# Patient Record
Sex: MALE | Race: WHITE | Employment: FULL TIME | URBAN - METROPOLITAN AREA
[De-identification: names, ages, dates, MRNs, and addresses within clinical notes are randomized per-mention and may not be internally consistent; named-entity substitution may affect disease eponyms.]

---

## 2018-09-04 ENCOUNTER — APPOINTMENT (OUTPATIENT)
Dept: CT IMAGING | Age: 63
DRG: 149 | End: 2018-09-04
Attending: EMERGENCY MEDICINE
Payer: MEDICARE

## 2018-09-04 ENCOUNTER — HOSPITAL ENCOUNTER (INPATIENT)
Age: 63
LOS: 1 days | Discharge: HOME | DRG: 149 | End: 2018-09-06
Attending: EMERGENCY MEDICINE | Admitting: GENERAL ACUTE CARE HOSPITAL
Payer: MEDICARE

## 2018-09-04 DIAGNOSIS — H91.93 BILATERAL DEAFNESS: ICD-10-CM

## 2018-09-04 DIAGNOSIS — R42 VERTIGO: ICD-10-CM

## 2018-09-04 DIAGNOSIS — E78.01 FAMILIAL HYPERCHOLESTEROLEMIA: ICD-10-CM

## 2018-09-04 DIAGNOSIS — R42 DIZZINESS: ICD-10-CM

## 2018-09-04 LAB
ALBUMIN SERPL-MCNC: 3.8 G/DL (ref 3.5–5)
ALBUMIN/GLOB SERPL: 1.2 {RATIO} (ref 1.1–2.2)
ALP SERPL-CCNC: 78 U/L (ref 45–117)
ALT SERPL-CCNC: 22 U/L (ref 12–78)
ANION GAP SERPL CALC-SCNC: 9 MMOL/L (ref 5–15)
APPEARANCE UR: CLEAR
AST SERPL-CCNC: 15 U/L (ref 15–37)
ATRIAL RATE: 78 BPM
BACTERIA URNS QL MICRO: NEGATIVE /HPF
BASOPHILS # BLD: 0 K/UL (ref 0–0.1)
BASOPHILS NFR BLD: 0 % (ref 0–1)
BILIRUB SERPL-MCNC: 0.5 MG/DL (ref 0.2–1)
BILIRUB UR QL: NEGATIVE
BUN SERPL-MCNC: 16 MG/DL (ref 6–20)
BUN/CREAT SERPL: 16 (ref 12–20)
CALCIUM SERPL-MCNC: 8.7 MG/DL (ref 8.5–10.1)
CALCULATED P AXIS, ECG09: 49 DEGREES
CALCULATED R AXIS, ECG10: -38 DEGREES
CALCULATED T AXIS, ECG11: 15 DEGREES
CHLORIDE SERPL-SCNC: 107 MMOL/L (ref 97–108)
CK SERPL-CCNC: 81 U/L (ref 39–308)
CO2 SERPL-SCNC: 26 MMOL/L (ref 21–32)
COLOR UR: ABNORMAL
CREAT SERPL-MCNC: 1.02 MG/DL (ref 0.7–1.3)
DIAGNOSIS, 93000: NORMAL
DIFFERENTIAL METHOD BLD: ABNORMAL
EOSINOPHIL # BLD: 0 K/UL (ref 0–0.4)
EOSINOPHIL NFR BLD: 0 % (ref 0–7)
EPITH CASTS URNS QL MICRO: ABNORMAL /LPF
ERYTHROCYTE [DISTWIDTH] IN BLOOD BY AUTOMATED COUNT: 12.8 % (ref 11.5–14.5)
GLOBULIN SER CALC-MCNC: 3.3 G/DL (ref 2–4)
GLUCOSE SERPL-MCNC: 134 MG/DL (ref 65–100)
GLUCOSE UR STRIP.AUTO-MCNC: 100 MG/DL
HCT VFR BLD AUTO: 40 % (ref 36.6–50.3)
HGB BLD-MCNC: 14.1 G/DL (ref 12.1–17)
HGB UR QL STRIP: NEGATIVE
HYALINE CASTS URNS QL MICRO: ABNORMAL /LPF (ref 0–5)
IMM GRANULOCYTES # BLD: 0 K/UL (ref 0–0.04)
IMM GRANULOCYTES NFR BLD AUTO: 0 % (ref 0–0.5)
KETONES UR QL STRIP.AUTO: NEGATIVE MG/DL
LEUKOCYTE ESTERASE UR QL STRIP.AUTO: NEGATIVE
LYMPHOCYTES # BLD: 0.8 K/UL (ref 0.8–3.5)
LYMPHOCYTES NFR BLD: 8 % (ref 12–49)
MCH RBC QN AUTO: 31.1 PG (ref 26–34)
MCHC RBC AUTO-ENTMCNC: 35.3 G/DL (ref 30–36.5)
MCV RBC AUTO: 88.1 FL (ref 80–99)
MONOCYTES # BLD: 0.4 K/UL (ref 0–1)
MONOCYTES NFR BLD: 4 % (ref 5–13)
NEUTS SEG # BLD: 8.5 K/UL (ref 1.8–8)
NEUTS SEG NFR BLD: 88 % (ref 32–75)
NITRITE UR QL STRIP.AUTO: NEGATIVE
NRBC # BLD: 0 K/UL (ref 0–0.01)
NRBC BLD-RTO: 0 PER 100 WBC
P-R INTERVAL, ECG05: 162 MS
PH UR STRIP: 7.5 [PH] (ref 5–8)
PLATELET # BLD AUTO: 169 K/UL (ref 150–400)
PMV BLD AUTO: 9.8 FL (ref 8.9–12.9)
POTASSIUM SERPL-SCNC: 3.6 MMOL/L (ref 3.5–5.1)
PROT SERPL-MCNC: 7.1 G/DL (ref 6.4–8.2)
PROT UR STRIP-MCNC: NEGATIVE MG/DL
Q-T INTERVAL, ECG07: 406 MS
QRS DURATION, ECG06: 98 MS
QTC CALCULATION (BEZET), ECG08: 462 MS
RBC # BLD AUTO: 4.54 M/UL (ref 4.1–5.7)
RBC #/AREA URNS HPF: ABNORMAL /HPF (ref 0–5)
RBC MORPH BLD: ABNORMAL
SODIUM SERPL-SCNC: 142 MMOL/L (ref 136–145)
SP GR UR REFRACTOMETRY: 1.02 (ref 1–1.03)
TROPONIN I SERPL-MCNC: <0.05 NG/ML
UA: UC IF INDICATED,UAUC: ABNORMAL
UROBILINOGEN UR QL STRIP.AUTO: 1 EU/DL (ref 0.2–1)
VENTRICULAR RATE, ECG03: 78 BPM
WBC # BLD AUTO: 9.7 K/UL (ref 4.1–11.1)
WBC URNS QL MICRO: ABNORMAL /HPF (ref 0–4)

## 2018-09-04 PROCEDURE — 36415 COLL VENOUS BLD VENIPUNCTURE: CPT | Performed by: EMERGENCY MEDICINE

## 2018-09-04 PROCEDURE — 81001 URINALYSIS AUTO W/SCOPE: CPT | Performed by: EMERGENCY MEDICINE

## 2018-09-04 PROCEDURE — 99218 HC RM OBSERVATION: CPT

## 2018-09-04 PROCEDURE — G8978 MOBILITY CURRENT STATUS: HCPCS

## 2018-09-04 PROCEDURE — 96374 THER/PROPH/DIAG INJ IV PUSH: CPT

## 2018-09-04 PROCEDURE — 97530 THERAPEUTIC ACTIVITIES: CPT

## 2018-09-04 PROCEDURE — 74011250636 HC RX REV CODE- 250/636

## 2018-09-04 PROCEDURE — 99285 EMERGENCY DEPT VISIT HI MDM: CPT

## 2018-09-04 PROCEDURE — 74011250636 HC RX REV CODE- 250/636: Performed by: EMERGENCY MEDICINE

## 2018-09-04 PROCEDURE — 93005 ELECTROCARDIOGRAM TRACING: CPT

## 2018-09-04 PROCEDURE — 82550 ASSAY OF CK (CPK): CPT | Performed by: EMERGENCY MEDICINE

## 2018-09-04 PROCEDURE — 80053 COMPREHEN METABOLIC PANEL: CPT | Performed by: EMERGENCY MEDICINE

## 2018-09-04 PROCEDURE — 96375 TX/PRO/DX INJ NEW DRUG ADDON: CPT

## 2018-09-04 PROCEDURE — G8979 MOBILITY GOAL STATUS: HCPCS

## 2018-09-04 PROCEDURE — 97161 PT EVAL LOW COMPLEX 20 MIN: CPT

## 2018-09-04 PROCEDURE — 85025 COMPLETE CBC W/AUTO DIFF WBC: CPT | Performed by: EMERGENCY MEDICINE

## 2018-09-04 PROCEDURE — 70450 CT HEAD/BRAIN W/O DYE: CPT

## 2018-09-04 PROCEDURE — 84484 ASSAY OF TROPONIN QUANT: CPT | Performed by: EMERGENCY MEDICINE

## 2018-09-04 RX ORDER — ACETAMINOPHEN 325 MG/1
650 TABLET ORAL
Status: DISCONTINUED | OUTPATIENT
Start: 2018-09-04 | End: 2018-09-06 | Stop reason: HOSPADM

## 2018-09-04 RX ORDER — SODIUM CHLORIDE 0.9 % (FLUSH) 0.9 %
5-10 SYRINGE (ML) INJECTION AS NEEDED
Status: DISCONTINUED | OUTPATIENT
Start: 2018-09-04 | End: 2018-09-06 | Stop reason: HOSPADM

## 2018-09-04 RX ORDER — MECLIZINE HCL 12.5 MG 12.5 MG/1
25 TABLET ORAL
Status: COMPLETED | OUTPATIENT
Start: 2018-09-04 | End: 2018-09-04

## 2018-09-04 RX ORDER — ACYCLOVIR 400 MG/1
400 TABLET ORAL DAILY
COMMUNITY

## 2018-09-04 RX ORDER — MECLIZINE HYDROCHLORIDE 25 MG/1
25 TABLET ORAL EVERY 6 HOURS
Status: DISCONTINUED | OUTPATIENT
Start: 2018-09-04 | End: 2018-09-06 | Stop reason: HOSPADM

## 2018-09-04 RX ORDER — LORAZEPAM 2 MG/ML
1 INJECTION INTRAMUSCULAR
Status: COMPLETED | OUTPATIENT
Start: 2018-09-04 | End: 2018-09-04

## 2018-09-04 RX ORDER — ONDANSETRON 2 MG/ML
4 INJECTION INTRAMUSCULAR; INTRAVENOUS
Status: DISCONTINUED | OUTPATIENT
Start: 2018-09-04 | End: 2018-09-06 | Stop reason: HOSPADM

## 2018-09-04 RX ORDER — ACETAMINOPHEN 650 MG/1
650 SUPPOSITORY RECTAL
Status: DISCONTINUED | OUTPATIENT
Start: 2018-09-04 | End: 2018-09-06 | Stop reason: HOSPADM

## 2018-09-04 RX ORDER — ONDANSETRON 2 MG/ML
INJECTION INTRAMUSCULAR; INTRAVENOUS
Status: COMPLETED
Start: 2018-09-04 | End: 2018-09-04

## 2018-09-04 RX ORDER — GUAIFENESIN 100 MG/5ML
81 LIQUID (ML) ORAL DAILY
Status: DISCONTINUED | OUTPATIENT
Start: 2018-09-05 | End: 2018-09-06 | Stop reason: HOSPADM

## 2018-09-04 RX ORDER — OMEPRAZOLE 40 MG/1
40 CAPSULE, DELAYED RELEASE ORAL DAILY
COMMUNITY

## 2018-09-04 RX ORDER — ONDANSETRON 2 MG/ML
4 INJECTION INTRAMUSCULAR; INTRAVENOUS
Status: COMPLETED | OUTPATIENT
Start: 2018-09-04 | End: 2018-09-04

## 2018-09-04 RX ORDER — SODIUM CHLORIDE 0.9 % (FLUSH) 0.9 %
5-10 SYRINGE (ML) INJECTION EVERY 8 HOURS
Status: DISCONTINUED | OUTPATIENT
Start: 2018-09-04 | End: 2018-09-06 | Stop reason: HOSPADM

## 2018-09-04 RX ADMIN — SODIUM CHLORIDE 1000 ML: 900 INJECTION, SOLUTION INTRAVENOUS at 10:07

## 2018-09-04 RX ADMIN — LORAZEPAM 1 MG: 2 INJECTION INTRAMUSCULAR; INTRAVENOUS at 10:08

## 2018-09-04 RX ADMIN — MECLIZINE 25 MG: 12.5 TABLET ORAL at 11:55

## 2018-09-04 RX ADMIN — MECLIZINE 25 MG: 12.5 TABLET ORAL at 10:07

## 2018-09-04 RX ADMIN — ONDANSETRON 4 MG: 2 INJECTION INTRAMUSCULAR; INTRAVENOUS at 09:17

## 2018-09-04 NOTE — ED NOTES
Assumed care of pt from [facility]. Pt eating dinner and in no acute distress at this time. VSS. Call bell within reach.

## 2018-09-04 NOTE — PROGRESS NOTES
Problem: Mobility Impaired (Adult and Pediatric) Goal: *Acute Goals and Plan of Care (Insert Text) 7Physical Therapy Goals Initiated 9/4/2018 1. Patient will transfer from bed to chair and chair to bed with independence using the least restrictive device within 7 day(s). 2.  Patient will perform sit to stand with independence within 7 day(s). 3.  Patient will ambulate with independence for 250 feet with the least restrictive device within 7 day(s). 4.  Patient will ascend/descend 12 stairs with handrail(s) with independence within 7 day(s). 5.  Patient will show improved MCKOY score to greater than or equal to 42 for low fall risk within 7days. physical Therapy Emergency Department EVALUATION with CMS G codes 
recommended admission Patient: Kelli Alves (40 y.o. male) Date: 9/4/2018 Primary Diagnosis: There are no admission diagnoses documented for this encounter. Precautions: fall ASSESSMENT : 
Based on the objective data described below, the patient presents with decreased balance, functional mobility, gait and with dizziness. Asked by Dr. Claudette Bugler to see pt for eval. 
Pt lives by himself and is normally fully independent with all activity without a device. He works with heavy machinery. He has not had any issues in the past with mobility. He has B choclear implants. At this time, pt is c/o dizziness and oscillopsia with position changes. He shows some discontinuity in smooth pursuit. He is able to complete VOR but appears to have more difficutly with head turn R. Saccades appear intact. On antoine hallpike, pt presents with strong nystagmus (post 2 doses of meclizine) to R and L remarkable with quick phase toward floor to each side. Although pt notes a slowing to near stop of the oscillopsia, nystagmus does slows but does not fully fatigue to either side.  On standing, pt lists to the left with dynamic movement and posterior left with eyes open rhomberg. When closing eyes, he is able to keep balance but shows increased sway. On the MCKOY balance testing, he scores 27/56 placing him in the moderate fall risk category. Pt 's strength is 5/5 with no apparent focal or lateralized deficit. His finger to nose and heel to shin is intact. He is independent in bed mobility but requires CGA for transfers and amb without device due to issues noted above. At this time, pt is showing significantly impaired function and gait from his normally independent baseline. Nystagmus is not clearly lateralized/consistent to one side and quick phase is as noted. He shows a list in standing and gait. Rounded with Dr. Isabel Hopson. Due to the above presentation, she is seeking admission to the hospital. Pt will benefit from PT in the acute setting. Recommendations for PT post acute care will depend upon progress and final diagnosis. Admission for this patient is recommended with continued acute therapy. PLAN : 
Frequency/Duration: Pending admission, the patient will be followed by physical therapy 5 times a week to address goals. If admitted, Recommendations and Planned Interventions: 
[]           Bed Mobility Training             [x]    Neuromuscular Re-Education 
[x]           Transfer Training                   []    Orthotic/Prosthetic Training 
[x]           Gait Training                         []    Modalities [x]           Therapeutic Exercises           []    Edema Management/Control 
[x]           Therapeutic Activities            [x]    Patient and Family Training/Education 
[]           Other (comment): 
 
Discharge Recommendations:  
 
[]   Home with family []   Skilled nursing facility []   Admission to hospital with rehab likely needed 
[]   Inpatient rehab referral 
[]   Outpatient physical therapy referral 
[x]   Other:see note; to be determined after final dx and progress Further Equipment Recommendations for Discharge: []   Rolling walker with 5\" wheels 
[]   Crutches  
[]   Cane  
[]   Wheelchair  
[x]   Other: TBD 
 
COMMUNICATION/EDUCATION:  
Communication/Collaboration: 
[]   Fall prevention education was provided and the patient/caregiver indicated understanding. [x]   Patient/family have participated as able and agree with findings and recommendations. []   Patient is unable to participate in plan of care at this time. Findings and recommendations were discussed with: MD physician SUBJECTIVE:  
Patient stated I don't feel balanced.  OBJECTIVE DATA SUMMARY:  
 
Past Medical History:  
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   
 as of 6/10/16 pt states ongoing x1 yr and still being evaluated Past Surgical History:  
Procedure Laterality Date  HX CATARACT REMOVAL Right 06/14/2016  HX HERNIA REPAIR Bilateral 2010 Bilateral Inguinal Hernia Repair with Mesh  NEUROLOGICAL PROCEDURE UNLISTED  approx 2010 Excision Cyst from Petuitary in Brain Prior Level of Function/Home Situation: fully independent w/o device Home Situation Home Environment: Private residence Living Alone: Yes Support Systems: Family member(s) Patient Expects to be Discharged to[de-identified] Private residence Current DME Used/Available at Home: None Critical Behavior: 
Neurologic State: Alert Orientation Level: Oriented X4 Cognition: Follows commands Strength:   
Strength: Within functional limits Tone & Sensation:  
Tone: Normal 
  
  
  
  
Sensation: Intact Range Of Motion: 
AROM: Within functional limits PROM: Within functional limits Coordination: 
Coordination: Within functional limits (heel to shin and finger to nose intact) Functional Mobility: 
Bed Mobility: 
Rolling: Independent Supine to Sit: Independent Sit to Supine: Independent Transfers: 
Sit to Stand: Contact guard assistance Stand to Sit: Contact guard assistance Balance:  
Sitting: Intact Standing: Impaired Standing - Static: Occassional;Fair 
Standing - Dynamic : Poor Ambulation/Gait Training: 
Distance (ft): 25 Feet (ft) Assistive Device: Gait belt Ambulation - Level of Assistance: Contact guard assistance Gait Abnormalities: Path deviations;Trunk sway increased; Other (list to L) Base of Support: Widened Speed/Kylie: Slow Swing Pattern: Left asymmetrical;Right asymmetrical 
  
  
  
    
 
Special Tests: 
Mckoy Balance Test: 
 
Sitting to Standing: 3 Standing Unsupported: 2 Sitting with Back Unsupported: 4 Standing to Sittin Transfers: 4 Standing Unsupported with Eyes Closed: 3 Standing Unsupported with Feet Together: 0 Reach Forward with Outstretched Arm: 0  Object: 3 Turn to Look Over Shoulders: 3 Turn 360 Degrees: 0 Alternate Foot on Step/Stool: 1 Standing Unsupported One Foot in Front: 0 Stand on One Le Total: 27 
  
 
 
56=Maximum possible score;  
0-20=High fall risk 21-40=Moderate fall risk 41-56=Low fall risk Mckoy Balance Test and G-code impairment scale: 
Percentage of Impairment CH 
 
0% 
 CI 
 
1-19% CJ 
 
20-39% CK 
 
40-59% CL 
 
60-79% CM 
 
80-99% CN  
 
100% Destiney Khanna Score 0-56 56 45-55 34-44 23-33 12-22 1-11 0 In compliance with CMSs Claims Based Outcome Reporting, the following G-code set was chosen for this patient based on their primary functional limitation being treated: The outcome measure chosen to determine the severity of the functional limitation was the MCKOY with a score of 27/56 which was correlated with the impairment scale. ? Mobility - Walking and Moving Around:  
  - CURRENT STATUS: CK - 40%-59% impaired, limited or restricted  - GOAL STATUS: CH - 0% impaired, limited or restricted  - D/C STATUS:  ---------------To be determined---------------  
 
Pain: 
  no c/o Please refer to the flowsheet for vital signs taken during this treatment. After treatment:  
[]   Patient left in no apparent distress sitting up in chair 
[x]   Patient left in no apparent distress in bed 
[x]   Call bell left within reach [x]   Nursing notified 
[]   Caregiver present 
[]   Bed alarm activated Thank you for this referral. 
Abel Arvizu, PT Time Calculation: 23 mins

## 2018-09-04 NOTE — ED PROVIDER NOTES
EMERGENCY DEPARTMENT HISTORY AND PHYSICAL EXAM 
 
 
Date: 9/4/2018 Patient Name: Christine Caraballo History of Presenting Illness Chief Complaint Patient presents with  Dizziness  
  pt reports he woke during the night with dizziness, vomited x3  Vomiting History Provided By: Patient HPI: Christine Caraballo, 70 y.o. male with PMHx significant for bilateral cochlear implants, GERD, presents to the ED with cc of dizziness x 3 AM today, becoming constant at 6 AM with associated nausea and vomiting. He states he woke up at 3 AM dizzy and stumbled to the bathroom. Pt states he went back to sleep and woke again at 6 AM still dizzy with nausea and vomiting. He states the dizziness is exacerbated by walking. Pt notes he went to bed last night at 11 PM and felt fine at that time. He denies recent sinus or ear infection. Pt denies HA, vision changes, or tingling/numbness. He denies CP, SOB, diarrhea, or dysuria. Pt denies PMHx of heart problems. There are no other complaints, changes, or physical findings at this time. PCP: Mary Atkins MD 
 
Current Outpatient Prescriptions Medication Sig Dispense Refill  atorvastatin (LIPITOR) 20 mg tablet Take  by mouth daily. Past History Past Medical History: 
Past Medical History:  
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   
 as of 6/10/16 pt states ongoing x1 yr and still being evaluated Past Surgical History: 
Past Surgical History:  
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 Social History: 
Social History Substance Use Topics  Smoking status: Never Smoker  Smokeless tobacco: Not on file  Alcohol use Yes Comment: 3 mixed drinks per week as of 6/10/16 Allergies: 
No Known Allergies Review of Systems Review of Systems Constitutional: Negative for chills, fatigue and fever. HENT: Negative for congestion, rhinorrhea and sore throat. Eyes: Negative for pain, discharge and visual disturbance. Respiratory: Negative for cough, chest tightness, shortness of breath and wheezing. Cardiovascular: Negative for chest pain, palpitations and leg swelling. Gastrointestinal: Positive for nausea and vomiting. Negative for abdominal pain, constipation and diarrhea. Genitourinary: Negative for dysuria, frequency and hematuria. Musculoskeletal: Negative for arthralgias, back pain and myalgias. Skin: Negative for rash. Neurological: Positive for dizziness. Negative for weakness, light-headedness, numbness and headaches. Psychiatric/Behavioral: Negative. Physical Exam  
Physical Exam  
Constitutional: He is oriented to person, place, and time. He appears well-developed and well-nourished. No distress. HENT:  
Head: Normocephalic and atraumatic. Eyes: EOM are normal. Pupils are equal, round, and reactive to light. Right eye exhibits no discharge. Left eye exhibits no discharge. No scleral icterus. Right eye exhibits no nystagmus. Left eye exhibits no nystagmus. Neck: Normal range of motion. Neck supple. No tracheal deviation present. Cardiovascular: Normal rate, regular rhythm, normal heart sounds and intact distal pulses. Exam reveals no gallop and no friction rub. No murmur heard. Pulmonary/Chest: Effort normal and breath sounds normal. No respiratory distress. He has no wheezes. He has no rales. Abdominal: Soft. He exhibits no distension. There is no tenderness. Musculoskeletal: Normal range of motion. He exhibits no edema. Lymphadenopathy:  
  He has no cervical adenopathy. Neurological: He is alert and oriented to person, place, and time. Pt has normal speech and facial symmetry. 5/5 strength in all extremities. FNF and heel to shin intact b/l. Pronator drift negative. Skin: Skin is warm and dry. No rash noted. Psychiatric: He has a normal mood and affect. Nursing note and vitals reviewed. Diagnostic Study Results Labs - Recent Results (from the past 12 hour(s)) EKG, 12 LEAD, INITIAL Collection Time: 09/04/18  8:59 AM  
Result Value Ref Range Ventricular Rate 78 BPM  
 Atrial Rate 78 BPM  
 P-R Interval 162 ms QRS Duration 98 ms Q-T Interval 406 ms QTC Calculation (Bezet) 462 ms Calculated P Axis 49 degrees Calculated R Axis -38 degrees Calculated T Axis 15 degrees Diagnosis Normal sinus rhythm Possible Left atrial enlargement Left axis deviation Left ventricular hypertrophy When compared with ECG of 03-FEB-2010 08:21, No significant change was found Confirmed by Basil Phoenix MD, Perlita Goodrich ([id number]) on 9/4/2018 2:21:04 PM 
  
CBC WITH AUTOMATED DIFF Collection Time: 09/04/18  9:16 AM  
Result Value Ref Range WBC 9.7 4.1 - 11.1 K/uL  
 RBC 4.54 4.10 - 5.70 M/uL  
 HGB 14.1 12.1 - 17.0 g/dL HCT 40.0 36.6 - 50.3 % MCV 88.1 80.0 - 99.0 FL  
 MCH 31.1 26.0 - 34.0 PG  
 MCHC 35.3 30.0 - 36.5 g/dL  
 RDW 12.8 11.5 - 14.5 % PLATELET 163 402 - 240 K/uL MPV 9.8 8.9 - 12.9 FL  
 NRBC 0.0 0  WBC ABSOLUTE NRBC 0.00 0.00 - 0.01 K/uL NEUTROPHILS 88 (H) 32 - 75 % LYMPHOCYTES 8 (L) 12 - 49 % MONOCYTES 4 (L) 5 - 13 % EOSINOPHILS 0 0 - 7 % BASOPHILS 0 0 - 1 % IMMATURE GRANULOCYTES 0 0.0 - 0.5 % ABS. NEUTROPHILS 8.5 (H) 1.8 - 8.0 K/UL  
 ABS. LYMPHOCYTES 0.8 0.8 - 3.5 K/UL  
 ABS. MONOCYTES 0.4 0.0 - 1.0 K/UL  
 ABS. EOSINOPHILS 0.0 0.0 - 0.4 K/UL  
 ABS. BASOPHILS 0.0 0.0 - 0.1 K/UL  
 ABS. IMM. GRANS. 0.0 0.00 - 0.04 K/UL  
 DF SMEAR SCANNED    
 RBC COMMENTS NORMOCYTIC, NORMOCHROMIC METABOLIC PANEL, COMPREHENSIVE  Collection Time: 09/04/18  9:16 AM  
 Result Value Ref Range Sodium 142 136 - 145 mmol/L Potassium 3.6 3.5 - 5.1 mmol/L Chloride 107 97 - 108 mmol/L  
 CO2 26 21 - 32 mmol/L Anion gap 9 5 - 15 mmol/L Glucose 134 (H) 65 - 100 mg/dL BUN 16 6 - 20 MG/DL Creatinine 1.02 0.70 - 1.30 MG/DL  
 BUN/Creatinine ratio 16 12 - 20 GFR est AA >60 >60 ml/min/1.73m2 GFR est non-AA >60 >60 ml/min/1.73m2 Calcium 8.7 8.5 - 10.1 MG/DL Bilirubin, total 0.5 0.2 - 1.0 MG/DL  
 ALT (SGPT) 22 12 - 78 U/L  
 AST (SGOT) 15 15 - 37 U/L Alk. phosphatase 78 45 - 117 U/L Protein, total 7.1 6.4 - 8.2 g/dL Albumin 3.8 3.5 - 5.0 g/dL Globulin 3.3 2.0 - 4.0 g/dL A-G Ratio 1.2 1.1 - 2.2 CK W/ REFLX CKMB Collection Time: 09/04/18  9:16 AM  
Result Value Ref Range CK 81 39 - 308 U/L  
TROPONIN I Collection Time: 09/04/18  9:16 AM  
Result Value Ref Range Troponin-I, Qt. <0.05 <0.05 ng/mL URINALYSIS W/ REFLEX CULTURE Collection Time: 09/04/18 11:09 AM  
Result Value Ref Range Color YELLOW/STRAW Appearance CLEAR CLEAR Specific gravity 1.018 1.003 - 1.030    
 pH (UA) 7.5 5.0 - 8.0 Protein NEGATIVE  NEG mg/dL Glucose 100 (A) NEG mg/dL Ketone NEGATIVE  NEG mg/dL Bilirubin NEGATIVE  NEG Blood NEGATIVE  NEG Urobilinogen 1.0 0.2 - 1.0 EU/dL Nitrites NEGATIVE  NEG Leukocyte Esterase NEGATIVE  NEG    
 WBC 0-4 0 - 4 /hpf  
 RBC 0-5 0 - 5 /hpf Epithelial cells FEW FEW /lpf Bacteria NEGATIVE  NEG /hpf  
 UA:UC IF INDICATED CULTURE NOT INDICATED BY UA RESULT CNI Hyaline cast 0-2 0 - 5 /lpf Radiologic Studies -  
 
CT Results  (Last 48 hours) 09/04/18 0957  CT HEAD WO CONT Final result Impression:  IMPRESSION: No acute changes. Narrative:  EXAM:  CT HEAD WO CONT INDICATION:   dizziness with n/v x last night COMPARISON: None. CONTRAST:  None. TECHNIQUE: Unenhanced CT of the head was performed using 5 mm images.  Brain and  
 bone windows were generated. CT dose reduction was achieved through use of a  
standardized protocol tailored for this examination and automatic exposure  
control for dose modulation. FINDINGS:  
There is no extra-axial fluid collection hemorrhage or shift. No mass. Metallic  
artifact prominent distal basilar artery. Medical Decision Making I am the first provider for this patient. I reviewed the vital signs, available nursing notes, past medical history, past surgical history, family history and social history. Vital Signs-Reviewed the patient's vital signs. Patient Vitals for the past 12 hrs: 
 Pulse Resp BP SpO2  
09/04/18 1300 86 16 126/74 98 % 09/04/18 1245 85 15 133/71 97 % 09/04/18 1230 84 14 136/77 97 % 09/04/18 1215 83 22 130/74 98 % 09/04/18 1200 82 19 133/72 95 % 09/04/18 1145 83 18 119/72 97 % 09/04/18 1130 83 14 134/73 96 % 09/04/18 1115 82 10 128/73 99 % 09/04/18 1100 86 11 126/73 97 % 09/04/18 1045 84 15 131/69 96 % 09/04/18 1030 78 11 131/74 99 % 09/04/18 1028 83 15 - 99 % 09/04/18 1015 79 17 129/72 95 % 09/04/18 1002 - - 135/70 -  
09/04/18 0945 80 16 132/71 93 % 09/04/18 0930 84 19 128/69 95 % 09/04/18 0909 82 22 147/89 96 % 09/04/18 0905 - - 148/82 - Pulse Oximetry Analysis - 96% on RA 
 
 
EKG interpretation: (Preliminary) 8:59 Rhythm: normal sinus rhythm; and regular . Rate (approx.): 78; WI interval: normal; QRS interval: normal ; ST/T wave: normal. 
Written by JAMIE Braga, as dictated by Perez Hernandez MD. Records Reviewed: Nursing Notes, Old Medical Records, Previous Radiology Studies and Previous Laboratory Studies Provider Notes (Medical Decision Making): DDx: BPPV, labyrinthitis, dehydration, electrolyte abnormality, TIA, CVA, intracranial mass ED Course:  
Initial assessment performed.  The patients presenting problems have been discussed, and they are in agreement with the care plan formulated and outlined with them. I have encouraged them to ask questions as they arise throughout their visit. 11:53 AM 
Attempted to ambulate pt, but he became dizzy. Will tx with additional Meclizine and re-evaluate. 1:07 PM 
Attempted to ambulate pt again. However, he was unable to ambulate secondary to dizziness. Will have PT evaluate him.  
 
1:56 PM 
Pt has been evaluated by PT. She states during exam, pt was having difficulty ambulating and was leaning to the L. After discussion, will admit for possible central vertigo. CONSULT NOTE:  
2:42 PM 
Jennifer Winkler MD spoke with Dr. Arnold Guzman, Specialty: Hospitalist 
Discussed pt's hx, disposition, and available diagnostic and imaging results. Reviewed care plans. Consultant will evaluate pt for admission. PLAN: 
1. Admit to Hospitalist 
 
Admission Note: 
2:46 PM 
Patient is being admitted to the hospital by Dr. Jagruti Kelly. The results of their tests and reasons for their admission have been discussed with them and available family. They convey agreement and understanding for the need to be admitted and for their admission diagnosis. Diagnosis Clinical Impression: Dizzy, vertigo D Attestations: This note is prepared by Jojo Baird and Lucio Watt, acting as a Scribes for MD Jennifer Hagen MD: The scribe's documentation has been prepared under my direction and personally reviewed by me in its entirety. I confirm that the notes above accurately reflects all work, treatment, procedures, and medical decision making performed by me.

## 2018-09-04 NOTE — H&P
Hospitalist Admission Note NAME: Kitty Baer :  1955 MRN:  [id number] Date/Time:  2018 7:57 PM 
 
Patient PCP: Leslie Sosa MD 
_____________________________________________________________________ Given the patient's current clinical presentation, I have a high level of concern for decompensation if discharged from the emergency department. Complex decision making was performed, which includes reviewing the patient's available past medical records, laboratory results, and x-ray films. My assessment of this patient's clinical condition and my plan of care is as follows. Assessment / Plan: 
 
Vertigo Nausea vomiting Hx bilateral cochlear implants 
-CT head negative 
-history of occupational hearing loss but denies history of strokes or BPV. Suspect this is all peripheral vertigo but he is at risk for strokes given his age and hx HLD. -unable to complete MRI  
-consult Neurology in AM.  Start baby ASA 
-check HgA1c and lipid profile in AM 
-PT OT eval and treat.   
-schedule meclizine. Zofran IV prn 
 
HLD 
-continue lipitor Code Status:  Full Surrogate Decision Maker: DVT Prophylaxis:  SCD 
GI Prophylaxis: not indicated Baseline:  . Works with fabrication. Subjective: CHIEF COMPLAINT:   dizziness HISTORY OF PRESENT ILLNESS:    
Chris Espinoza is a 70 y.o.  male with a history of HLD and  Bilateral cochlear implants who presents with acute onset of vertigo. Patient felt fine last night when he went to bed at 11pm.  He woke up at around 3am to use the restroom and then realized that he felt dizzy and unsteady. He describes vertigo type symptoms worse with change in position. He went back to sleep and woke up again at [place] still dizzy but now also with nausea and vomiting. His symptoms persisted so he presented to the ER for evaluation. CT head negative.   He was treated with meclizine and zofran but remained symptomatic. We were asked to admit for work up and evaluation of the above problems. Past Medical History:  
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   
 as of 6/10/16 pt states ongoing x1 yr and still being evaluated Past Surgical History:  
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 Authorizing Provider  
acyclovir (ZOVIRAX) 400 mg tablet Take 400 mg by mouth daily. Yes Phys Other, MD  
omeprazole (PRILOSEC) 40 mg capsule Take 40 mg by mouth daily. Yes Phys Other, MD  
vit C/E/Zn/coppr/lutein/zeaxan (PRESERVISION AREDS 2 PO) Take 1 Cap by mouth daily. Yes Phys Other, MD  
atorvastatin (LIPITOR) 20 mg tablet Take 20 mg by mouth daily. Yes Historical Provider REVIEW OF SYSTEMS:    
 
Total of 12 systems reviewed as follows:   
   POSITIVE= underlined text  Negative = text not underlined General:  fever, chills, sweats, generalized weakness, weight loss/gain,  
   loss of appetite Eyes:    blurred vision, eye pain, loss of vision, double vision ENT:    rhinorrhea, pharyngitis Respiratory:   cough, sputum production, SOB, BOCANEGRA, wheezing, pleuritic pain  
Cardiology:   chest pain, palpitations, orthopnea, PND, edema, syncope Gastrointestinal:  abdominal pain , N/V, diarrhea, dysphagia, constipation, bleeding Genitourinary:  frequency, urgency, dysuria, hematuria, incontinence Muskuloskeletal :  arthralgia, myalgia, back pain Hematology:  easy bruising, nose or gum bleeding, lymphadenopathy Dermatological: rash, ulceration, pruritis, color change / jaundice Endocrine:   hot flashes or polydipsia Neurological:  headache, dizziness / vertigo, confusion, focal weakness, paresthesia, Speech difficulties, memory loss, gait difficulty Psychological: Feelings of anxiety, depression, agitation Objective: VITALS:   
Visit Vitals  /74  Pulse 87  Resp 18  Ht 5' 11\" (1.803 m)  Wt 70.5 kg (155 lb 6.8 oz)  SpO2 95%  BMI 21.68 kg/m2 PHYSICAL EXAM: 
 
General:    Alert, cooperative, no distress, appears stated age. HEENT: Atraumatic, anicteric sclerae, pink conjunctivae. No oral ulcers, mucosa moist, throat clear, dentition fair Neck:  Supple, symmetrical,  thyroid: non tender Lungs:   Clear to auscultation bilaterally. No Wheezing or Rhonchi. No rales. Chest wall:  No tenderness  No Accessory muscle use. Heart:   Regular  rhythm,  No  murmur   No edema Abdomen:   Soft, non-tender. Not distended. Bowel sounds normal 
Extremities: No cyanosis. No clubbing,   
Skin:     Not pale. Not Jaundiced  No rashes Psych:  Good insight. Not depressed. Not anxious or agitated. Neurologic: EOMs intact. No facial asymmetry. No aphasia or slurred speech. Symmetrical strength, Sensation grossly intact. Alert and oriented X 4. No nystagmus 
 
_______________________________________________________________________ Care Plan discussed with: 
  Comments Patient x Family RN Care Manager Consultant:     
_______________________________________________________________________ Expected  Disposition:  
Home with Family x HH/PT/OT/RN   
SNF/LTC   
[facility]   
________________________________________________________________________ TOTAL TIME:  50  Minutes Critical Care Provided     Minutes non procedure based Comments  
 x Reviewed previous records  
>50% of visit spent in counseling and coordination of care  Discussion with patient and/or family and questions answered Given the patient's current clinical presentation, I have a high level of concern for decompensation if discharged from the ED. Complex decision making was performed which includes reviewing the patient's available past medical records, laboratory results, and Xray films. I have also directly communicated my plan and discussed this case with the involved ED physician.  
 
____________________________________________________________________ Syed Jarquin MD 
 
Procedures: see electronic medical records for all procedures/Xrays and details which were not copied into this note but were reviewed prior to creation of Plan. LAB DATA REVIEWED:   
Recent Results (from the past 24 hour(s)) EKG, 12 LEAD, INITIAL Collection Time: 09/04/18  8:59 AM  
Result Value Ref Range Ventricular Rate 78 BPM  
 Atrial Rate 78 BPM  
 P-R Interval 162 ms QRS Duration 98 ms Q-T Interval 406 ms QTC Calculation (Bezet) 462 ms Calculated P Axis 49 degrees Calculated R Axis -38 degrees Calculated T Axis 15 degrees Diagnosis Normal sinus rhythm Possible Left atrial enlargement Left axis deviation Left ventricular hypertrophy When compared with ECG of 03-FEB-2010 08:21, No significant change was found Confirmed by Magdalena Barry MD, Speedy Notice ([id number]) on 9/4/2018 2:21:04 PM 
  
CBC WITH AUTOMATED DIFF Collection Time: 09/04/18  9:16 AM  
Result Value Ref Range WBC 9.7 4.1 - 11.1 K/uL  
 RBC 4.54 4.10 - 5.70 M/uL  
 HGB 14.1 12.1 - 17.0 g/dL HCT 40.0 36.6 - 50.3 % MCV 88.1 80.0 - 99.0 FL  
 MCH 31.1 26.0 - 34.0 PG  
 MCHC 35.3 30.0 - 36.5 g/dL  
 RDW 12.8 11.5 - 14.5 % PLATELET 745 583 - 056 K/uL MPV 9.8 8.9 - 12.9 FL  
 NRBC 0.0 0  WBC ABSOLUTE NRBC 0.00 0.00 - 0.01 K/uL NEUTROPHILS 88 (H) 32 - 75 % LYMPHOCYTES 8 (L) 12 - 49 % MONOCYTES 4 (L) 5 - 13 % EOSINOPHILS 0 0 - 7 % BASOPHILS 0 0 - 1 % IMMATURE GRANULOCYTES 0 0.0 - 0.5 % ABS. NEUTROPHILS 8.5 (H) 1.8 - 8.0 K/UL  
 ABS. LYMPHOCYTES 0.8 0.8 - 3.5 K/UL  
 ABS. MONOCYTES 0.4 0.0 - 1.0 K/UL  
 ABS. EOSINOPHILS 0.0 0.0 - 0.4 K/UL  
 ABS. BASOPHILS 0.0 0.0 - 0.1 K/UL  
 ABS. IMM. GRANS. 0.0 0.00 - 0.04 K/UL  
 DF SMEAR SCANNED    
 RBC COMMENTS NORMOCYTIC, NORMOCHROMIC METABOLIC PANEL, COMPREHENSIVE Collection Time: 09/04/18  9:16 AM  
Result Value Ref Range Sodium 142 136 - 145 mmol/L Potassium 3.6 3.5 - 5.1 mmol/L Chloride 107 97 - 108 mmol/L  
 CO2 26 21 - 32 mmol/L Anion gap 9 5 - 15 mmol/L Glucose 134 (H) 65 - 100 mg/dL BUN 16 6 - 20 MG/DL Creatinine 1.02 0.70 - 1.30 MG/DL  
 BUN/Creatinine ratio 16 12 - 20 GFR est AA >60 >60 ml/min/1.73m2 GFR est non-AA >60 >60 ml/min/1.73m2 Calcium 8.7 8.5 - 10.1 MG/DL Bilirubin, total 0.5 0.2 - 1.0 MG/DL  
 ALT (SGPT) 22 12 - 78 U/L  
 AST (SGOT) 15 15 - 37 U/L Alk. phosphatase 78 45 - 117 U/L Protein, total 7.1 6.4 - 8.2 g/dL Albumin 3.8 3.5 - 5.0 g/dL Globulin 3.3 2.0 - 4.0 g/dL A-G Ratio 1.2 1.1 - 2.2 CK W/ REFLX CKMB Collection Time: 09/04/18  9:16 AM  
Result Value Ref Range CK 81 39 - 308 U/L  
TROPONIN I Collection Time: 09/04/18  9:16 AM  
Result Value Ref Range Troponin-I, Qt. <0.05 <0.05 ng/mL URINALYSIS W/ REFLEX CULTURE Collection Time: 09/04/18 11:09 AM  
Result Value Ref Range Color YELLOW/STRAW Appearance CLEAR CLEAR Specific gravity 1.018 1.003 - 1.030    
 pH (UA) 7.5 5.0 - 8.0 Protein NEGATIVE  NEG mg/dL Glucose 100 (A) NEG mg/dL Ketone NEGATIVE  NEG mg/dL Bilirubin NEGATIVE  NEG Blood NEGATIVE  NEG Urobilinogen 1.0 0.2 - 1.0 EU/dL Nitrites NEGATIVE  NEG Leukocyte Esterase NEGATIVE  NEG    
 WBC 0-4 0 - 4 /hpf  
 RBC 0-5 0 - 5 /hpf Epithelial cells FEW FEW /lpf Bacteria NEGATIVE  NEG /hpf  
 UA:UC IF INDICATED CULTURE NOT INDICATED BY UA RESULT CNI Hyaline cast 0-2 0 - 5 /lpf

## 2018-09-04 NOTE — PROGRESS NOTES
Pharmacy Clarification of Prior to Admission Medication Regimen The patient was interviewed regarding clarification of the prior to admission medication regimen and was questioned regarding use of any other inhalers, topical products, over the counter medications, herbal medications, vitamin products or ophthalmic/nasal/otic medication use. Information Obtained From: Patient, Mackenzie Restrepo Pertinent Pharmacy Findings: 
? Updated patients preferred outpatient pharmacy to: [address] 
? acyclovir (ZOVIRAX) 400 mg tablet: Per patient, this agent is prescribed BID, but patient is taking this agent daily. PTA medication list was corrected to the following:  
 
Prior to Admission Medications Prescriptions Last Dose Informant Patient Reported? Taking?  
acyclovir (ZOVIRAX) 400 mg tablet 9/3/2018 at Unknown time Self Yes Yes Sig: Take 400 mg by mouth daily. atorvastatin (LIPITOR) 20 mg tablet 9/3/2018 at Unknown time Self Yes Yes Sig: Take 20 mg by mouth daily. omeprazole (PRILOSEC) 40 mg capsule 9/3/2018 at Unknown time Self Yes Yes Sig: Take 40 mg by mouth daily. vit C/E/Zn/coppr/lutein/zeaxan (PRESERVISION AREDS 2 PO) 9/3/2018 at Unknown time Self Yes Yes Sig: Take 1 Cap by mouth daily. Facility-Administered Medications: None Thank you, 
Maine Guerra CPhT Medication History Pharmacy Technician

## 2018-09-04 NOTE — IP AVS SNAPSHOT
[address] [facility] 
[phone] Patient: Iris Chu MRN: [id number] HQI:[id number] About your hospitalization You were admitted on:  September 4, 2018 You last received care in the:  [facility] 3 NEUROSCIENCE TELEMETRY You were discharged on:  September 6, 2018 Why you were hospitalized Your primary diagnosis was:  Not on File Your diagnoses also included:  Vertigo Follow-up Information Follow up With Details Comments Contact Info [address] [place] [address] on 9/10/2018 at 10:00 please arrive at 9:30 to register for your appointment with photo ID and insurance card [address] [facility] 
[phone] [facility] On 9/12/2018 arrive at 8:45 AM for 9 AM appointment with your PCP, Dr. Rad Sim for post hospital follow up appointment. [address] [facility] [place] [address] 
[phone] Fredi Fontaine MD Go on 10/16/2018 Please follow up on October 16, 2018 at 8:40 arriving at 8:10 to register with photo ID, insurance card and current list of medication  [address] 
[address] [facility] 
[phone] Your Scheduled Appointments Tuesday October 16, 2018  8:40 AM EDT HOSPITAL FOLLOW-UP with Fredi Fontaine MD  
Neurology Clinic at [facility]) [address] [facility], 
[address] [place] [address], [address] [facility]  
[phone] Discharge Orders None A check ja indicates which time of day the medication should be taken. My Medications START taking these medications Instructions Each Dose to Equal  
 Morning Noon Evening Bedtime  
 aspirin 81 mg chewable tablet Start taking on:  9/7/2018 Your last dose was: Your next dose is: Take 1 Tab by mouth daily.   
 81 mg  
    
   
   
   
  
 meclizine 25 mg tablet Commonly known as:  ANTIVERT Your last dose was: Your next dose is: Take 1 Tab by mouth three (3) times daily as needed for up to 10 days. 25 mg CONTINUE taking these medications Instructions Each Dose to Equal  
 Morning Noon Evening Bedtime  
 acyclovir 400 mg tablet Commonly known as:  ZOVIRAX Your last dose was: Your next dose is: Take 400 mg by mouth daily. 400 mg  
    
   
   
   
  
 atorvastatin 20 mg tablet Commonly known as:  LIPITOR Your last dose was: Your next dose is: Take 20 mg by mouth daily. 20 mg  
    
   
   
   
  
 omeprazole 40 mg capsule Commonly known as:  PRILOSEC Your last dose was: Your next dose is: Take 40 mg by mouth daily. 40 mg PRESERVISION AREDS 2 PO Your last dose was: Your next dose is: Take 1 Cap by mouth daily. 1 Cap Where to Get Your Medications These medications were sent to [place] [address], [facility] [address] [address] Phone:  [phone]  
  aspirin 81 mg chewable tablet  
 meclizine 25 mg tablet Discharge Instructions Vertigo: Care Instructions Your Care Instructions Vertigo is the feeling that you or your surroundings are moving when there is no actual movement. It is often described as a feeling of spinning, whirling, falling, or tilting. Vertigo may make you vomit or feel nauseated. You may have trouble standing or walking and may lose your balance. Vertigo is often related to an inner ear problem, but it can have other more serious causes. If vertigo continues, you may need more tests to find its cause. Follow-up care is a key part of your treatment and safety.  Be sure to make and go to all appointments, and call your doctor if you are having problems. It's also a good idea to know your test results and keep a list of the medicines you take. How can you care for yourself at home? · Do not lie flat on your back. Prop yourself up slightly. This may reduce the spinning feeling. Keep your eyes open. · Move slowly so that you do not fall. · If your doctor recommends medicine, take it exactly as directed. · Do not drive while you are having vertigo. Certain exercises, called Razo-Daroff exercises, can help decrease vertigo. To do Razo-Daroff exercises: · Sit on the edge of a bed or sofa and quickly lie down on the side that causes the worst vertigo. Lie on your side with your ear down. · Stay in this position for at least 30 seconds or until the vertigo goes away. · Sit up. If this causes vertigo, wait for it to stop. · Repeat the procedure on the other side. · Repeat this 10 times. Do these exercises 2 times a day until the vertigo is gone. When should you call for help? Call 911 anytime you think you may need emergency care. For example, call if: 
  · You passed out (lost consciousness).  
  · You have symptoms of a stroke. These may include: 
¨ Sudden numbness, tingling, weakness, or loss of movement in your face, arm, or leg, especially on only one side of your body. ¨ Sudden vision changes. ¨ Sudden trouble speaking. ¨ Sudden confusion or trouble understanding simple statements. ¨ Sudden problems with walking or balance. ¨ A sudden, severe headache that is different from past headaches.  
 Call your doctor now or seek immediate medical care if: 
  · Vertigo occurs with a fever, a headache, or ringing in your ears.  
  · You have new or increased nausea and vomiting.  
 Watch closely for changes in your health, and be sure to contact your doctor if: 
  · Vertigo gets worse or happens more often.  
  · Vertigo has not gotten better after 2 weeks. Where can you learn more? Go to http://nicolette-eder.info/. Enter N566 in the search box to learn more about \"Vertigo: Care Instructions. \" Current as of: May 12, 2017 Content Version: 11.7 © 3811-8270 [employer]. Care instructions adapted under license by [employer] (which disclaims liability or warranty for this information). If you have questions about a medical condition or this instruction, always ask your healthcare professional. [facility] any warranty or liability for your use of this information. HazelMail Announcement We are excited to announce that we are making your provider's discharge notes available to you in HazelMail. You will see these notes when they are completed and signed by the physician that discharged you from your recent hospital stay. If you have any questions or concerns about any information you see in HazelMail, please call the Health Information Department where you were seen or reach out to your Primary Care Provider for more information about your plan of care. Introducing [facility]! Altagracia Bell introduces HazelMail patient portal. Now you can access parts of your medical record, email your doctor's office, and request medication refills online. 1. In your internet browser, go to [email or web address] [email or web address] 2. Click on the First Time User? Click Here link in the Sign In box. You will see the New Member Sign Up page. 3. Enter your HazelMail Access Code exactly as it appears below. You will not need to use this code after youve completed the sign-up process. If you do not sign up before the expiration date, you must request a new code. · HazelMail Access Code: [email or web address] Expires: 12/3/2018  7:21 PM 
 
4. Enter the last four digits of your Social Security Number (xxxx) and Date of Birth (mm/dd/yyyy) as indicated and click Submit.  You will be taken to the next sign-up page. 5. Create a NowPublict ID. This will be your [facility] login ID and cannot be changed, so think of one that is secure and easy to remember. 6. Create a NowPublict password. You can change your password at any time. 7. Enter your Password Reset Question and Answer. This can be used at a later time if you forget your password. 8. Enter your e-mail address. You will receive e-mail notification when new information is available in [address] 9. Click Sign Up. You can now view and download portions of your medical record. 10. Click the Download Summary menu link to download a portable copy of your medical information. If you have questions, please visit the Frequently Asked Questions section of the [facility] website. Remember, [facility] is NOT to be used for urgent needs. For medical emergencies, dial 911. Now available from your iPhone and Android! Introducing Armen Francis As a [facility] patient, I wanted to make you aware of our electronic visit tool called Armen Everanjumluis. [facility] 24/7 allows you to connect within minutes with a medical provider 24 hours a day, seven days a week via a mobile device or tablet or logging into a secure website from your computer. You can access Armen Francis from anywhere in the [place]. A virtual visit might be right for you when you have a simple condition and feel like you just dont want to get out of bed, or cant get away from work for an appointment, when your regular [facility] provider is not available (evenings, weekends or holidays), or when youre out of town and need minor care. Electronic visits cost only $49 and if the [facility] 24/7 provider determines a prescription is needed to treat your condition, one can be electronically transmitted to a nearby pharmacy*. Please take a moment to enroll today if you have not already done so.   The enrollment process is free and takes just a few minutes. To enroll, please download the [facility] 24/7 oni to your tablet or phone, or visit [email or web address] to enroll on your computer. And, as an [address] [place] [address] patient with a [employer] account, the results of your visits will be scanned into your electronic medical record and your primary care provider will be able to view the scanned results. We urge you to continue to see your regular [facility] provider for your ongoing medical care. And while your primary care provider may not be the one available when you seek a [employer] virtual visit, the peace of mind you get from getting a real diagnosis real time can be priceless. For more information on H[employer]Spark, view our Frequently Asked Questions (FAQs) at [email or web address]. Sincerely, 
 
Anette Andrew MD 
Chief Medical Officer [employer] *:  certain medications cannot be prescribed via [employer]ark Unresulted Labs-Please follow up with your PCP about these lab tests Order Current Status LYME AB TOTAL W/RFLX W BLOT In process Providers Seen During Your Hospitalization Provider Specialty Primary office phone Deborah Rodriguez MD Emergency Medicine [phone] Jere Villanueva MD Internal Medicine [phone] Your Primary Care Physician (PCP) Primary Care Physician Office Phone Office Fax Morgan Christie [phone] You are allergic to the following No active allergies Recent Documentation Height Weight BMI Smoking Status 1.803 m 70.5 kg 21.68 kg/m2 Never Smoker Emergency Contacts Name Discharge Info Relation Home Work Mobile Joann Mota DISCHARGE CAREGIVER [3] Daughter [21] [phone] Patient Belongings The following personal items are in your possession at time of discharge: Dental Appliances: At bedside, Lowers, Uppers  Visual Aid: None      Home Medications: None   Jewelry: None  Clothing: Pants, Socks (socks, underwear, shoes)    Other Valuables: Cell Phone, Eyeglasses (cell phone ) Please provide this summary of care documentation to your next provider. Signatures-by signing, you are acknowledging that this After Visit Summary has been reviewed with you and you have received a copy. Patient Signature:  ____________________________________________________________ Date:  ____________________________________________________________  
  
Giuseppe Mary Provider Signature:  ____________________________________________________________ Date:  ____________________________________________________________

## 2018-09-05 ENCOUNTER — APPOINTMENT (OUTPATIENT)
Dept: CT IMAGING | Age: 63
DRG: 149 | End: 2018-09-05
Attending: PSYCHIATRY & NEUROLOGY
Payer: MEDICARE

## 2018-09-05 LAB
CHOLEST SERPL-MCNC: 164 MG/DL
EST. AVERAGE GLUCOSE BLD GHB EST-MCNC: NORMAL MG/DL
HBA1C MFR BLD: 4.8 % (ref 4.2–6.3)
HDLC SERPL-MCNC: 46 MG/DL
HDLC SERPL: 3.6 {RATIO} (ref 0–5)
LDLC SERPL CALC-MCNC: 96.4 MG/DL (ref 0–100)
LIPID PROFILE,FLP: NORMAL
TRIGL SERPL-MCNC: 108 MG/DL (ref ?–150)
VLDLC SERPL CALC-MCNC: 21.6 MG/DL

## 2018-09-05 PROCEDURE — 97165 OT EVAL LOW COMPLEX 30 MIN: CPT

## 2018-09-05 PROCEDURE — 74011250636 HC RX REV CODE- 250/636: Performed by: GENERAL ACUTE CARE HOSPITAL

## 2018-09-05 PROCEDURE — 74011250637 HC RX REV CODE- 250/637: Performed by: INTERNAL MEDICINE

## 2018-09-05 PROCEDURE — 99218 HC RM OBSERVATION: CPT

## 2018-09-05 PROCEDURE — 80061 LIPID PANEL: CPT | Performed by: INTERNAL MEDICINE

## 2018-09-05 PROCEDURE — G8987 SELF CARE CURRENT STATUS: HCPCS

## 2018-09-05 PROCEDURE — 77030033269 HC SLV COMPR SCD KNE2 CARD -B

## 2018-09-05 PROCEDURE — 36415 COLL VENOUS BLD VENIPUNCTURE: CPT | Performed by: INTERNAL MEDICINE

## 2018-09-05 PROCEDURE — 65270000029 HC RM PRIVATE

## 2018-09-05 PROCEDURE — 74011250636 HC RX REV CODE- 250/636: Performed by: INTERNAL MEDICINE

## 2018-09-05 PROCEDURE — 97116 GAIT TRAINING THERAPY: CPT

## 2018-09-05 PROCEDURE — 74011636320 HC RX REV CODE- 636/320: Performed by: GENERAL ACUTE CARE HOSPITAL

## 2018-09-05 PROCEDURE — 83036 HEMOGLOBIN GLYCOSYLATED A1C: CPT | Performed by: INTERNAL MEDICINE

## 2018-09-05 PROCEDURE — G8988 SELF CARE GOAL STATUS: HCPCS

## 2018-09-05 PROCEDURE — 70496 CT ANGIOGRAPHY HEAD: CPT

## 2018-09-05 RX ORDER — SODIUM CHLORIDE 9 MG/ML
50 INJECTION, SOLUTION INTRAVENOUS
Status: COMPLETED | OUTPATIENT
Start: 2018-09-05 | End: 2018-09-05

## 2018-09-05 RX ORDER — SODIUM CHLORIDE 0.9 % (FLUSH) 0.9 %
10 SYRINGE (ML) INJECTION
Status: COMPLETED | OUTPATIENT
Start: 2018-09-05 | End: 2018-09-05

## 2018-09-05 RX ORDER — ATORVASTATIN CALCIUM 20 MG/1
20 TABLET, FILM COATED ORAL DAILY
Status: DISCONTINUED | OUTPATIENT
Start: 2018-09-05 | End: 2018-09-06 | Stop reason: HOSPADM

## 2018-09-05 RX ADMIN — ASPIRIN 81 MG: 81 TABLET, CHEWABLE ORAL at 08:36

## 2018-09-05 RX ADMIN — Medication 10 ML: at 06:54

## 2018-09-05 RX ADMIN — Medication 10 ML: at 14:02

## 2018-09-05 RX ADMIN — SODIUM CHLORIDE 50 ML/HR: 900 INJECTION, SOLUTION INTRAVENOUS at 18:30

## 2018-09-05 RX ADMIN — MECLIZINE HYDROCHLORIDE 25 MG: 25 TABLET ORAL at 06:53

## 2018-09-05 RX ADMIN — ACETAMINOPHEN 650 MG: 325 TABLET ORAL at 08:36

## 2018-09-05 RX ADMIN — Medication 10 ML: at 00:20

## 2018-09-05 RX ADMIN — Medication 10 ML: at 00:23

## 2018-09-05 RX ADMIN — Medication 10 ML: at 23:05

## 2018-09-05 RX ADMIN — MECLIZINE HYDROCHLORIDE 25 MG: 25 TABLET ORAL at 17:12

## 2018-09-05 RX ADMIN — MECLIZINE HYDROCHLORIDE 25 MG: 25 TABLET ORAL at 11:44

## 2018-09-05 RX ADMIN — ATORVASTATIN CALCIUM 20 MG: 20 TABLET, FILM COATED ORAL at 08:36

## 2018-09-05 RX ADMIN — IOPAMIDOL 100 ML: 755 INJECTION, SOLUTION INTRAVENOUS at 18:29

## 2018-09-05 RX ADMIN — MECLIZINE HYDROCHLORIDE 25 MG: 25 TABLET ORAL at 00:19

## 2018-09-05 RX ADMIN — MECLIZINE HYDROCHLORIDE 25 MG: 25 TABLET ORAL at 23:05

## 2018-09-05 RX ADMIN — Medication 10 ML: at 18:30

## 2018-09-05 NOTE — PROGRESS NOTES
Reason for Admission:   Pt was admitted under OBS on 9/4/18 d/t Vertigo and Vomiting. RRAT Score:          5 Plan for utilizing home health:      Not indicated at this time. Likelihood of Readmission:  LOW Transition of Care Plan:                  Pt lives alone in a two story home with 4 ROHIT. Pt has no DME. Pt has no experience with HH or SNF. 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 Pt I W ADLs and IADLs. Pt drives. Pt goes to [facility] in [place]. CM is calling to make a follow up appointment for Pt now. CM talked to therapy who just saw the Pt. They are recommending to MD that we work with Pt one more time tomorrow before discharge. They are recommending Outpatient Therapy PT for Vestibular Therapy. Pt lives close to [id number] [facility]. CM placed OP Rehab form for [facility] on bedside chart for MD review and signature. Pt indicated that either his brother or DTR would be able to transport him home in car. 12 PM  
CM made PCP appointment and placed on AVS.  Pt usually sees NP Gisel Canada at this home. Pt will follow up with Dr. Chao Brown at follow up appointment with [facility]. CM will continue to monitor discharge plan. Care Management Interventions PCP Verified by CM: Yes 
Palliative Care Criteria Met (RRAT>21 & CHF Dx)?: No 
Mode of Transport at Discharge: Other (see comment) Transition of Care Consult (CM Consult): Other (Outpatient Vestibular Rehab. ) MyChart Signup: No 
Discharge Durable Medical Equipment: No 
Physical Therapy Consult: Yes Occupational Therapy Consult: Yes Speech Therapy Consult: Yes Current Support Network: Own Home, Lives Alone, Family Lives [place] Confirm Follow Up Transport: Self Plan discussed with Pt/Family/Caregiver: Yes Freedom of Choice Offered: Yes Discharge Location Discharge Placement: Home with outpatient services Sadie Alvarez CM Ext [phone]

## 2018-09-05 NOTE — PROGRESS NOTES
Problem: Self Care Deficits Care Plan (Adult) Goal: *Acute Goals and Plan of Care (Insert Text) Occupational Therapy Goals Initiated 9/5/2018 1. Patient will perform grooming at the sink with supervision/set-up within 7 day(s). 2.  Patient will perform bathing at the sink with supervision/set-up within 7 day(s). 3.  Patient will perform lower body dressing with supervision/set-up within 7 day(s). 4.  Patient will perform toilet transfers with supervision/set-up within 7 day(s). 5.  Patient will perform all aspects of toileting with independence within 7 day(s). 6.  Patient will participate in upper extremity therapeutic exercise/activities with independence for 5 minutes within 7 day(s). 7.  Patient will utilize energy conservation techniques during functional activities with verbal cues within 7 day(s). Occupational Therapy EVALUATION Patient: Mila Jo (83 y.o. male) Date: 9/5/2018 Primary Diagnosis: Vertigo Precautions:     
 
ASSESSMENT : 
Based on the objective data described below, the patient presents with generalized weakness, decreased endurance, strength, functional mobility, and ADLs and vertigo. Pt was living alone and independent in all areas prior and did not use AD. Pt was cleared to be seen for therapy and all VSS. He was supine in bed and he did not have any nystagmus noted in eyes and was able to track vertical and horizontally. Pt was SBA for bed mobility and able to stand with SBA and was CGA for transfers. He was able to walk in the room and in the caceres but was nervous about becoming nauseous if he moved to much and too fast.  Pt was left sitting up in recliner with call bell with in reach and nursing notified that pt was sitting up and recommend that pt have further therapy at discharge at out pt vestibular rehab. Recommend that pt return home with assist from family Patient will benefit from skilled intervention to address the above impairments. Patients rehabilitation potential is considered to be Good Factors which may influence rehabilitation potential include:  
[x]             None noted []             Mental ability/status []             Medical condition []             Home/family situation and support systems []             Safety awareness []             Pain tolerance/management 
[]             Other: PLAN : 
Recommendations and Planned Interventions: 
[x]               Self Care Training                  []        Therapeutic Activities [x]               Functional Mobility Training    []        Cognitive Retraining 
[x]               Therapeutic Exercises           [x]        Endurance Activities [x]               Balance Training                   []        Neuromuscular Re-Education [x]               Visual/Perceptual Training     [x]   Home Safety Training 
[x]               Patient Education                 [x]        Family Training/Education []               Other (comment): Frequency/Duration: Patient will be followed by occupational therapy 4 times a week to address goals. Discharge Recommendations: Outpatient Vestibular rehab Further Equipment Recommendations for Discharge: tbd SUBJECTIVE:  
Patient stated I dont want to move much and get sick again. Connie Hudson OBJECTIVE DATA SUMMARY:  
HISTORY:  
Past Medical History:  
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   
 as of 6/10/16 pt states ongoing x1 yr and still being evaluated Past Surgical History:  
Procedure Laterality Date  HX CATARACT REMOVAL Right 06/14/2016  HX HERNIA REPAIR Bilateral 2010 Bilateral Inguinal Hernia Repair with Mesh  NEUROLOGICAL PROCEDURE UNLISTED  approx 2010 Excision Cyst from Petuitary in Brain Prior Level of Function/Environment/Context: pt lives alone and was independent with ADLs and working prior. Occupations in which the patient is/was successful, what are the barriers preventing that success:  
Performance Patterns (routines, roles, habits, and rituals):  
Personal Interests and/or values:  
Expanded or extensive additional review of patient history:  
 
Home Situation Home Environment: Private residence # Steps to Enter: 4 One/Two Story Residence: Two story # of Interior Steps: 12 Living Alone: Yes Support Systems: Child(bethany) Patient Expects to be Discharged to[de-identified] Private residence Current DME Used/Available at Home: None Hand dominance: Right EXAMINATION OF PERFORMANCE DEFICITS: 
Cognitive/Behavioral Status: 
Neurologic State: Alert Orientation Level: Appropriate for age;Oriented X4 Cognition: Appropriate decision making; Follows commands Perception: Appears intact Perseveration: No perseveration noted Safety/Judgement: Awareness of environment; Fall prevention Skin: in good health Edema: none Hearing: Auditory Auditory Impairment: Hard of hearing, bilateral 
 
Vision/Perceptual:   
    
    
   intact Range of Motion: BUE functional  
  
   
 
Strength: BUE functional 
  
   
 
Coordination: 
  
Fine Motor Skills-Upper: Left Intact; Right Intact Gross Motor Skills-Upper: Left Intact; Right Intact Tone & Sensation: 
Intact in BUE Balance: 
Sitting: Intact Standing: Impaired Standing - Static: Good Standing - Dynamic : Fair Functional Mobility and Transfers for ADLs: 
Bed Mobility: 
Supine to Sit: Supervision Transfers: 
Sit to Stand: Stand-by assistance Stand to Sit: Stand-by assistance Bed to Chair: Contact guard assistance;Stand-by assistance Toilet Transfer : Contact guard assistance;Stand-by assistance ADL Assessment: 
Feeding: Independent Oral Facial Hygiene/Grooming: Independent Bathing: Minimum assistance;Contact guard assistance Upper Body Dressing: Independent Lower Body Dressing: Minimum assistance;Contact guard assistance Toileting: Contact guard assistance;Stand by assistance Cognitive Retraining Safety/Judgement: Awareness of environment; Fall prevention Functional Measure: 
Barthel Index: 
 
Bathin Bladder: 10 Bowels: 10 
Groomin Dressin Feeding: 10 Mobility: 10 Stairs: 0 Toilet Use: 5 Transfer (Bed to Chair and Back): 10 Total: 60 Barthel and G-code impairment scale: 
Percentage of impairment CH 
0% CI 
1-19% CJ 
20-39% CK 
40-59% CL 
60-79% CM 
80-99% CN 
100% Barthel Score 0-100 100 99-80 79-60 59-40 20-39 1-19 
 0 Barthel Score 0-20 20 17-19 13-16 9-12 5-8 1-4 0 The Barthel ADL Index: Guidelines 1. The index should be used as a record of what a patient does, not as a record of what a patient could do. 2. The main aim is to establish degree of independence from any help, physical or verbal, however minor and for whatever reason. 3. The need for supervision renders the patient not independent. 4. A patient's performance should be established using the best available evidence. Asking the patient, friends/relatives and nurses are the usual sources, but direct observation and common sense are also important. However direct testing is not needed. 5. Usually the patient's performance over the preceding 24-48 hours is important, but occasionally longer periods will be relevant. 6. Middle categories imply that the patient supplies over 50 per cent of the effort. 7. Use of aids to be independent is allowed. Spencer Valenzuela., Barthel, DClemW. (2120). Functional evaluation: the Barthel Index. [address] [facility] (14)2. [facility] EVE Clarke, Brandon Rivers.[facility]., Harry, [address] (). Measuring the change indisability after inpatient rehabilitation; comparison of the responsiveness of the Barthel Index and Functional [place] Measure. Journal of Neurology, Neurosurgery, and Psychiatry, 66(4), 144-932. SHELL Stevens, KIRBY Pablo, & Hawa Bazzi M.A. (2004.) Assessment of post-stroke quality of life in cost-effectiveness studies: The usefulness of the Barthel Index and the EuroQoL-5D. [facility], 13, 707-47 G codes: In compliance with CMSs Claims Based Outcome Reporting, the following G-code set was chosen for this patient based on their primary functional limitation being treated: The outcome measure chosen to determine the severity of the functional limitation was the Barthel with a score of 60/100 which was correlated with the impairment scale. ? Self Care:  
  - CURRENT STATUS: CJ - 20%-39% impaired, limited or restricted  - GOAL STATUS: CI - 1%-19% impaired, limited or restricted  - D/C STATUS:  ---------------To be determined--------------- Occupational Therapy Evaluation Charge Determination History Examination Decision-Making MEDIUM Complexity : Expanded review of history including physical, cognitive and psychosocial  history  LOW Complexity : 1-3 performance deficits relating to physical, cognitive , or psychosocial skils that result in activity limitations and / or participation restrictions  LOW Complexity : No comorbidities that affect functional and no verbal or physical assistance needed to complete eval tasks Based on the above components, the patient evaluation is determined to be of the following complexity level: LOW Pain: 
Pain Scale 1: Numeric (0 - 10) Pain Intensity 1: 0 Pain Location 1: Head 
Pain Orientation 1: Anterior Pain Description 1: Aching Pain Intervention(s) 1: Medication (see MAR) Activity Tolerance:  
good Please refer to the flowsheet for vital signs taken during this treatment. After treatment:  
[x] Patient left in no apparent distress sitting up in chair 
[] Patient left in no apparent distress in bed 
[x] Call bell left within reach [x] Nursing notified 
[] Caregiver present [] Bed alarm activated COMMUNICATION/EDUCATION:  
The patients plan of care was discussed with: Physical Therapist and Registered Nurse. [x] Home safety education was provided and the patient/caregiver indicated understanding. [x] Patient/family have participated as able in goal setting and plan of care. [x] Patient/family agree to work toward stated goals and plan of care. [] Patient understands intent and goals of therapy, but is neutral about his/her participation. [] Patient is unable to participate in goal setting and plan of care. This patients plan of care is appropriate for delegation to [facility]. Thank you for this referral. 
Rupert Kendall OT Time Calculation: 15 mins

## 2018-09-05 NOTE — ED NOTES
TRANSFER - OUT REPORT: 
 
Verbal report given to Stefani Little RN (name) on Karl Perez  being transferred to neuro tele (unit) for routine progression of care Report consisted of patients Situation, Background, Assessment and  
Recommendations(SBAR). Information from the following report(s) SBAR, Kardex, ED Summary, Intake/Output, MAR, Recent Results and Cardiac Rhythm NSR was reviewed with the receiving nurse. Lines:  
Peripheral IV 09/04/18 Right Antecubital (Active) Site Assessment Clean, dry, & intact 9/4/2018  9:15 AM  
Phlebitis Assessment 0 9/4/2018  9:15 AM  
Infiltration Assessment 0 9/4/2018  9:15 AM  
Dressing Status Clean, dry, & intact 9/4/2018  9:15 AM  
  
 
Opportunity for questions and clarification was provided. Patient transported with: 
 [employer]

## 2018-09-05 NOTE — CONSULTS
IP CONSULT TO NEUROLOGY  Consult performed by: Elis Jiménez  Consult ordered by: Isela Hunter            NEUROLOGY CONSULT    NAME Vincent Sandifer AGE 70 y.o. MRN [id number]  1955     REQUESTING PHYSICIAN: Hammad Mallory MD      CHIEF COMPLAINT:  dizziness     This is a 70 y.o. female with a medical history of hyperlipidemia admitted with sudden onset dizziness and unsteady gait that was worse on standing and improved on sitting. No associated weakness or sensory loss. MRI contraindicated because of cochlear implants. ASSESSMENT AND PLAN   1. Vertigo  Orthostatics  CTA     2. Hyperlipidemia  Continue atorvastatin    3. Dizziness  Continue meclizine    4. Deafness  S/p cochlear implants        ALLERGIES:  Review of patient's allergies indicates no known allergies.      Current Facility-Administered Medications   Medication Dose Route Frequency    atorvastatin (LIPITOR) tablet 20 mg  20 mg Oral DAILY    sodium chloride (NS) flush 5-10 mL  5-10 mL IntraVENous Q8H    sodium chloride (NS) flush 5-10 mL  5-10 mL IntraVENous PRN    acetaminophen (TYLENOL) tablet 650 mg  650 mg Oral Q4H PRN    Or    acetaminophen (TYLENOL) solution 650 mg  650 mg Per NG tube Q4H PRN    Or    acetaminophen (TYLENOL) suppository 650 mg  650 mg Rectal Q4H PRN    ondansetron (ZOFRAN) injection 4 mg  4 mg IntraVENous Q6H PRN    aspirin chewable tablet 81 mg  81 mg Oral DAILY    meclizine (ANTIVERT) tablet 25 mg  25 mg Oral Q6H       Past Medical History:   Diagnosis Date    Hearing loss     Left ear complete hearing loss    Hyperlipemia     Weight loss     as of 6/10/16 pt states ongoing x1 yr and still being evaluated       Social History   Substance Use Topics    Smoking status: Never Smoker    Smokeless tobacco: Not on file    Alcohol use Yes      Comment: 3 mixed drinks per week as of 6/10/16     Family History  No [place]'s  Hyperlipidemia    Review of Systems   Constitutional: Negative for chills and fever.   HENT: Positive for hearing loss. Negative for ear pain. Eyes: Negative for pain and discharge. Respiratory: Negative for cough and hemoptysis. Cardiovascular: Negative for chest pain and claudication. Gastrointestinal: Negative for constipation and diarrhea. Genitourinary: Negative for flank pain and hematuria. Musculoskeletal: Negative for back pain and myalgias. Skin: Negative for itching and rash. Neurological: Positive for dizziness. Negative for headaches. Endo/Heme/Allergies: Negative for environmental allergies. Does not bruise/bleed easily. Psychiatric/Behavioral: Negative for depression and hallucinations. Visit Vitals    /63 (BP 1 Location: Right arm, BP Patient Position: At rest)    Pulse 67    Temp 98.3 °F (36.8 °C)    Resp 18    Ht 5' 11\" (1.803 m)    Wt 155 lb 6.8 oz (70.5 kg)    SpO2 97%    BMI 21.68 kg/m2      General: Well developed, well nourished. Patient in no apparent distress   Head: Normocephalic, atraumatic, anicteric sclera   Neck Normal ROM, No thyromegally   Lungs:  Clear to auscultation bilaterally, No wheezes or rubs   Cardiac: Regular rate and rhythm with no murmurs. Abd: Bowel sounds were audible. No tenderness on palpation   Ext: No pedal edema   Skin: Supple no rash     NeurologicExam:  Mental Status: Alert and oriented to person place and time   Speech: Fluent no aphasia or dysarthria. Cranial Nerves:  II - XII Intact with the exception of hearing loss   Motor:  Full and symmetric strength of upper and lower proximal and distal muscles. Normal bulk and tone. Reflexes:   Deep tendon reflexes 2+/4 and symmetric. Sensory:   Symmetric and intact with no perceived deficits modalities involving small or large fibers. Gait:  Gait is unsteady and wide based     Tremor:   No tremor noted. Cerebellar:  Coordination intact. Neurovascular: No carotid bruits.  No JVD       REVIEWED IMAGING:    MRI :    Results from [facility] Encounter encounter on 03/05/16   MRI BRAIN W WO CONT   Narrative **Final Report**      ICD Codes / Adm. Diagnosis: 227.3  D35.2 / Benign neoplasm of pituitary g    Benign neoplasm of pituitary  Examination:  MR BRAIN W AND WO CON  - [id number] - Mar  5 2016 10:14AM  Accession No:  [id number]  Reason:  BENIGN NEOPLASM OF PITUTARY GLAND      REPORT:  INDICATION: BENIGN NEOPLASM OF PITUTARY GLAND , follow-up. D35.2. COMPARISON: January 22, 2009    EXAM: Thin section sagittal and coronal pre- and postenhanced T1-weighted   spin-echo MR images of the pituitary fossa are obtained. Sagittal   T1-weighted spin-echo, axial FLAIR and T2-weighted fast spin-echo, axial   diffusion weighted echo planar, axial T1-weighted spin-echo, and post IV   contrast-enhanced axial T1-weighted spin-echo MR images of the brain are   obtained. A total of 7 cc intravenous Gadavist was administered for the   study. FINDINGS: A hypoenhancing mass of the lateral adenohypophysis is again   demonstrated. It measures 8 mm anteroposterior, 4 mm transverse, and 6 mm   craniocaudal. Its size does not appear substantially changed in the   interval. A convex superior margin of the left pituitary gland is again   shown as is minimal rightward deviation of the pituitary infundibulum. Sellar size does not appear substantially changed. There is no mass   extension into the cavernous sinuses. There is no compression or   displacement of the optic chiasm or substantial component of suprasellar   mass. Whole brain images again shown numerous nonspecific foci of bilateral   cerebral white matter signal alteration. The ventricles and cortical sulci   are stable in size and configuration with greater prominence of the left   lateral ventricle compared with the right again noted no other intra-axial   extra-axial mass or enhancement abnormality is shown. The vascular flow   voids at the base of the brain remain normal in conspicuity.  The orbits are   normal. A small mucosal retention cyst is again shown inferiorly in the   right maxillary sinus. IMPRESSION: Stable MR imaging appearance including that of 8 x 4 x 6 mm left   pituitary microadenoma. Signing/Reading Doctor: Shawn Gleason ([id number])    Approved: ANJUM Gleason ([id number])  Mar  7 2016  8:38AM                                    CT:    Results from Hospital Encounter encounter on 09/04/18   CT HEAD WO CONT   Narrative EXAM:  CT HEAD WO CONT    INDICATION:   dizziness with n/v x last night    COMPARISON: None. CONTRAST:  None. TECHNIQUE: Unenhanced CT of the head was performed using 5 mm images. Brain and  bone windows were generated. CT dose reduction was achieved through use of a  standardized protocol tailored for this examination and automatic exposure  control for dose modulation. FINDINGS:  There is no extra-axial fluid collection hemorrhage or shift. No mass. Metallic  artifact prominent distal basilar artery. Impression IMPRESSION: No acute changes.         REVIEWED LABS:  Lab Results   Component Value Date/Time    WBC 9.7 09/04/2018 09:16 AM    HCT 40.0 09/04/2018 09:16 AM    HGB 14.1 09/04/2018 09:16 AM    PLATELET 414 05/06/6588 09:16 AM     Lab Results   Component Value Date/Time    Sodium 142 09/04/2018 09:16 AM    Potassium 3.6 09/04/2018 09:16 AM    Chloride 107 09/04/2018 09:16 AM    CO2 26 09/04/2018 09:16 AM    Glucose 134 (H) 09/04/2018 09:16 AM    BUN 16 09/04/2018 09:16 AM    Creatinine 1.02 09/04/2018 09:16 AM    Calcium 8.7 09/04/2018 09:16 AM       Lab Results   Component Value Date/Time    LDL, calculated 96.4 09/05/2018 03:23 AM     Lab Results   Component Value Date/Time    Hemoglobin A1c 4.8 09/05/2018 03:23 AM

## 2018-09-05 NOTE — ROUTINE PROCESS
Bedside shift change report given to Novak Niraj by Rhianna Marin RN. Report included the following information SBAR, Kardex and Recent Results. Zone Phone:   [phone] Significant changes during shift:  None Patient Information Iris Chu 70 y.o. 
9/4/2018  8:54 AM by Alfonso Degroot MD. Iris Chu was admitted from Home 
 
Problem List 
 
Patient Active Problem List  
 Diagnosis Date Noted  Vertigo 09/04/2018  Lt groin pain 06/13/2014 Past Medical History:  
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   
 as of 6/10/16 pt states ongoing x1 yr and still being evaluated Activity Status: OOB to Chair Yes Ambulated this shift Yes Bed Rest No 
 
DVT prophylaxis: DVT prophylaxis Med- No 
DVT prophylaxis SCD or MICHELE- Yes Patient Safety: 
 
Falls Score Total Score: 3 Safety Level_______ Bed Alarm On? Yes Sitter? No 
 
 
 
Discharge Plan: No  
 
Active Consults: 
IP CONSULT TO NEUROLOGY

## 2018-09-05 NOTE — PROGRESS NOTES
Problem: Falls - Risk of 
Goal: *Absence of Falls Document Tia Manus Fall Risk and appropriate interventions in the flowsheet. Outcome: Progressing Towards Goal 
Fall Risk Interventions: 
Mobility Interventions: Bed/chair exit alarm, OT consult for ADLs, Patient to call before getting OOB, PT Consult for mobility concerns Mentation Interventions: Adequate sleep, hydration, pain control, Increase mobility, More frequent rounding Medication Interventions: Bed/chair exit alarm, Evaluate medications/consider consulting pharmacy, Patient to call before getting OOB, Teach patient to arise slowly

## 2018-09-05 NOTE — PROGRESS NOTES
Bedside and Verbal shift change report given to tobias west (oncoming nurse) by Karlee Pérez (offgoing nurse). Report included the following information SBAR, Kardex, Intake/Output, MAR and Recent Results.

## 2018-09-05 NOTE — PROGRESS NOTES
Problem: Mobility Impaired (Adult and Pediatric) Goal: *Acute Goals and Plan of Care (Insert Text) 7Physical Therapy Goals Initiated 9/4/2018 1. Patient will transfer from bed to chair and chair to bed with independence using the least restrictive device within 7 day(s). 2.  Patient will perform sit to stand with independence within 7 day(s). 3.  Patient will ambulate with independence for 250 feet with the least restrictive device within 7 day(s). 4.  Patient will ascend/descend 12 stairs with handrail(s) with independence within 7 day(s). 5.  Patient will show improved MCKOY score to greater than or equal to 42 for low fall risk within 7days. physical Therapy TREATMENT Patient: Palmer Alvarado (89 y.o. male) Date: 9/5/2018 Diagnosis: Vertigo <principal problem not specified> Precautions:  Falls Chart, physical therapy assessment, plan of care and goals were reviewed. ASSESSMENT: 
Pt received supine in bed and agreeable to therapy. Pt tolerated session well and making good progress towards goals. Pt reported feeling less dizzy at rest and during positional changes compared to yesterday. Pt completed supine to sit with supervision. Pt performed sit<>stand with standby assist. Pt ambulated in the hallway with CGA. Pt demonstrated decreased rahul, altered arm swing, guarded posture and noted 1 episode LOB to the L, but able to correct self demonstrating adequate stepping strategy. Pt moving head in all directions during gait, but pt avoided neck flexion and extension due to reported pain. Pt assisted back to seated in chair with bed alarm set and all needs met. Patient will greatly benefit from additional PT during acute hospital stay to improve balance, gait, and confidence with mobility as well as further monitoring of dizziness. Pt will benefit from outpatient PT. Progression toward goals: 
[x]    Improving appropriately and progressing toward goals []    Improving slowly and progressing toward goals 
[]    Not making progress toward goals and plan of care will be adjusted PLAN: 
Patient continues to benefit from skilled intervention to address the above impairments. Continue treatment per established plan of care. Discharge Recommendations:  Outpatient vestibular PT Further Equipment Recommendations for Discharge:  none SUBJECTIVE:  
Patient stated I am less dizzy than I was yesterday.  OBJECTIVE DATA SUMMARY:  
Critical Behavior: 
Neurologic State: Alert Orientation Level: Oriented X4 Cognition: Appropriate decision making, Appropriate for age attention/concentration, Appropriate safety awareness Functional Mobility Training: 
Bed Mobility: 
  
Supine to Sit: Supervision Transfers: 
Sit to Stand: Stand-by assistance Stand to Sit: Stand-by assistance Balance: 
Sitting: Intact Standing: Impaired Standing - Static: Good Standing - Dynamic : Fair Ambulation/Gait Training: 
Distance (ft): 100 Feet (ft) Assistive Device: Gait belt Ambulation - Level of Assistance: Contact guard assistance Gait Abnormalities: Altered arm swing;Decreased step clearance;Trunk sway increased (1 episode lateral LOB to the L ) Base of Support: Widened Speed/Kylie: Pace decreased (<100 feet/min); Slow Step Length: Left shortened;Right shortened Pain: 
Pain Scale 1: Numeric (0 - 10) Pain Intensity 1: 0 Pain Location 1: Head 
Pain Orientation 1: Anterior Pain Description 1: Aching Pain Intervention(s) 1: Medication (see MAR) Activity Tolerance:  
Good. VSS Please refer to the flowsheet for vital signs taken during this treatment. After treatment:  
[x]    Patient left in no apparent distress sitting up in chair 
[]    Patient left in no apparent distress in bed 
[x]    Call bell left within reach [x]    Nursing notified 
[]    Caregiver present [x]    Bed alarm activated COMMUNICATION/COLLABORATION:  
The patients plan of care was discussed with: Occupational Therapist and Registered Nurse Neida Montgomery PT, DPT Time Calculation: 15 mins

## 2018-09-05 NOTE — PROGRESS NOTES
Hospitalist Progress Note NAME: Ruben Allan :  1955 MRN:  [id number] Assessment / Plan: 
Vertigo Nausea vomiting Hx bilateral cochlear implants 
-CT head negative 
-history of occupational hearing loss but denies history of strokes or BPV. Suspect this is all peripheral vertigo but he is at risk for strokes given his age and hx HLD. -unable to complete MRI  
-consult Neurology in AM.  Start baby ASA 
-check HgA1c and lipid profile in AM 
-PT OT eval and treat.   
-schedule meclizine. Zofran IV prn 
 
: 
A1c noted LDL wnl Pending Neurology consult 
  
HLD 
-continue lipitor 18.5 - 24.9 Normal weight / Body mass index is 21.68 kg/(m^2). Code status: Full Prophylaxis: SCD's Recommended Disposition:  PT, OT, RN Subjective: Chief Complaint / Reason for Physician Visit Feels better - states dizziness has improved \"a little. \"  Discussed with RN events overnight. Review of Systems: 
Symptom Y/N Comments  Symptom Y/N Comments Fever/Chills n   Chest Pain n   
Poor Appetite n   Edema Cough n   Abdominal Pain Sputum    Joint Pain SOB/BOCANEGRA n   Pruritis/Rash Nausea/vomit    Tolerating PT/OT Diarrhea    Tolerating Diet Constipation    Other Could NOT obtain due to:   
 
Objective: VITALS:  
Last 24hrs VS reviewed since prior progress note. Most recent are: 
Patient Vitals for the past 24 hrs: 
 Temp Pulse Resp BP SpO2  
18 1443 98.3 °F (36.8 °C) 67 18 130/63 97 % 18 1121 97.5 °F (36.4 °C) 77 20 126/77 96 % 18 0729 97.5 °F (36.4 °C) 67 19 130/84 96 % 18 0303 98.7 °F (37.1 °C) 70 18 120/63 97 % 18 2324 97.8 °F (36.6 °C) 77 18 129/68 98 % 18 2100 97.9 °F (36.6 °C) 92 18 125/74 95 % 18 -  21 120/62 95 % 18 -  18 128/74 95 % 18 190 - 86 18 126/75 96 % 18 1630 - 82 15 126/75 96 % 18 1615 - 83 13 134/81 97 % 09/04/18 1600 - 82 15 127/74 96 % 09/04/18 1545 - 88 20 136/76 96 % 09/04/18 1530 - 87 12 138/69 99 % 09/04/18 1515 - 84 14 140/78 96 % Intake/Output Summary (Last 24 hours) at 09/05/18 1513 Last data filed at 09/05/18 1016 Gross per 24 hour Intake              120 ml Output              400 ml Net             -280 ml PHYSICAL EXAM: 
General: WD, WN. Alert, cooperative, no acute distress   
EENT:  EOMI. Anicteric sclerae. MMM Resp:  CTA bilaterally, no wheezing or rales. No accessory muscle use CV:  Regular  rhythm,  No edema GI:  Soft, Non distended, Non tender.  +Bowel sounds Neurologic:  Alert and oriented X 3, normal speech, no visual gaze abnormalities Psych:   Good insight. Not anxious nor agitated Skin:  No rashes. No jaundice Reviewed most current lab test results and cultures  YES Reviewed most current radiology test results   YES Review and summation of old records today    NO Reviewed patient's current orders and MAR    YES 
PMH/ reviewed - no change compared to H&P 
________________________________________________________________________ Care Plan discussed with: 
  Comments Patient x Family RN x Care Manager Consultant Multidiciplinary team rounds were held today with , nursing, pharmacist and clinical coordinator. Patient's plan of care was discussed; medications were reviewed and discharge planning was addressed. ________________________________________________________________________ Total NON critical care TIME:  25   Minutes Total CRITICAL CARE TIME Spent:   Minutes non procedure based Comments >50% of visit spent in counseling and coordination of care    
________________________________________________________________________ Melany Kendall MD  
 
Procedures: see electronic medical records for all procedures/Xrays and details which were not copied into this note but were reviewed prior to creation of Plan. LABS: 
I reviewed today's most current labs and imaging studies. Pertinent labs include: 
Recent Labs  
   09/04/18 
 0916 WBC  9.7 HGB  14.1 HCT  40.0 PLT  169 Recent Labs  
   09/04/18 
 9719 NA  142  
K  3.6 CL  107 CO2  26 GLU  134* BUN  16  
CREA  1.02  
CA  8.7 ALB  3.8 TBILI  0.5 SGOT  15 ALT  22 Signed: Rachell Degroot MD

## 2018-09-06 VITALS
DIASTOLIC BLOOD PRESSURE: 68 MMHG | HEART RATE: 80 BPM | RESPIRATION RATE: 20 BRPM | HEIGHT: 71 IN | OXYGEN SATURATION: 97 % | SYSTOLIC BLOOD PRESSURE: 121 MMHG | BODY MASS INDEX: 21.76 KG/M2 | WEIGHT: 155.42 LBS | TEMPERATURE: 97.6 F

## 2018-09-06 LAB
FOLATE SERPL-MCNC: 10.9 NG/ML (ref 5–21)
TSH SERPL DL<=0.05 MIU/L-ACNC: 1.52 UIU/ML (ref 0.36–3.74)
VIT B12 SERPL-MCNC: 255 PG/ML (ref 193–986)

## 2018-09-06 PROCEDURE — 74011250636 HC RX REV CODE- 250/636: Performed by: INTERNAL MEDICINE

## 2018-09-06 PROCEDURE — 74011250637 HC RX REV CODE- 250/637: Performed by: INTERNAL MEDICINE

## 2018-09-06 PROCEDURE — 82607 VITAMIN B-12: CPT | Performed by: PSYCHIATRY & NEUROLOGY

## 2018-09-06 PROCEDURE — 86618 LYME DISEASE ANTIBODY: CPT | Performed by: PSYCHIATRY & NEUROLOGY

## 2018-09-06 PROCEDURE — 36415 COLL VENOUS BLD VENIPUNCTURE: CPT | Performed by: PSYCHIATRY & NEUROLOGY

## 2018-09-06 PROCEDURE — 84443 ASSAY THYROID STIM HORMONE: CPT | Performed by: PSYCHIATRY & NEUROLOGY

## 2018-09-06 PROCEDURE — 74011250636 HC RX REV CODE- 250/636: Performed by: PSYCHIATRY & NEUROLOGY

## 2018-09-06 RX ORDER — GUAIFENESIN 100 MG/5ML
81 LIQUID (ML) ORAL DAILY
Qty: 30 TAB | Refills: 0 | Status: SHIPPED | OUTPATIENT
Start: 2018-09-07

## 2018-09-06 RX ORDER — CYANOCOBALAMIN 1000 UG/ML
1000 INJECTION, SOLUTION INTRAMUSCULAR; SUBCUTANEOUS ONCE
Status: COMPLETED | OUTPATIENT
Start: 2018-09-06 | End: 2018-09-06

## 2018-09-06 RX ORDER — MECLIZINE HYDROCHLORIDE 25 MG/1
25 TABLET ORAL
Qty: 40 TAB | Refills: 0 | Status: SHIPPED | OUTPATIENT
Start: 2018-09-06 | End: 2018-09-16

## 2018-09-06 RX ADMIN — ATORVASTATIN CALCIUM 20 MG: 20 TABLET, FILM COATED ORAL at 08:24

## 2018-09-06 RX ADMIN — CYANOCOBALAMIN 1000 MCG: 1000 INJECTION, SOLUTION INTRAMUSCULAR at 12:10

## 2018-09-06 RX ADMIN — MECLIZINE HYDROCHLORIDE 25 MG: 25 TABLET ORAL at 05:26

## 2018-09-06 RX ADMIN — Medication 10 ML: at 05:26

## 2018-09-06 RX ADMIN — ACETAMINOPHEN 650 MG: 325 TABLET ORAL at 08:23

## 2018-09-06 RX ADMIN — MECLIZINE HYDROCHLORIDE 25 MG: 25 TABLET ORAL at 11:30

## 2018-09-06 RX ADMIN — ASPIRIN 81 MG: 81 TABLET, CHEWABLE ORAL at 08:24

## 2018-09-06 NOTE — DISCHARGE SUMMARY
Hospitalist Discharge Summary     Patient ID:  Danielle Maya  [id number]  70 y.o.  1955    PCP on record: Kenya Taylor MD    Admit date: 9/4/2018  Discharge date and time: 9/6/2018      DISCHARGE DIAGNOSIS:  See below      CONSULTATIONS:  IP CONSULT TO NEUROLOGY    Excerpted HPI from H&P of Shirley Hendrickson MD:  Anthony Seo is a 70 y.o.  male with a history of HLD and  Bilateral cochlear implants who presents with acute onset of vertigo. Patient felt fine last night when he went to bed at 11pm.  He woke up at around 3am to use the restroom and then realized that he felt dizzy and unsteady. He describes vertigo type symptoms worse with change in position. He went back to sleep and woke up again at [place] still dizzy but now also with nausea and vomiting. His symptoms persisted so he presented to the ER for evaluation. CT head negative. He was treated with meclizine and zofran but remained symptomatic.     ______________________________________________________________________  DISCHARGE SUMMARY/HOSPITAL COURSE:  for full details see H&P, daily progress notes, labs, consult notes. Vertigo - improved  Nausea vomiting - resolved  Hx bilateral cochlear implants  -CT head negative  -history of occupational hearing loss but denies history of strokes or BPV.  Suspect this is all peripheral vertigo but he is at risk for strokes given his age and hx HLD.    -unable to complete MRI   -consult Neurology in AM.  Start baby ASA  -check HgA1c and lipid profile in AM  -PT OT eval and treat.    -schedule meclizine.  Zofran IV prn     9/5:  A1c noted  LDL wnl  Pending Neurology consult     9/6:  Neurology eval appreciated. CTA done - does not show any significant abnormalities. Pt stable to be discharged home with outpatient vestibular rehab. Meclizine PRN rx given.      HLD  -continue lipitor  _______________________________________________________________________  Patient seen and examined by me on discharge day. Pertinent Findings:  Gen:    Not in distress  Chest: Clear lungs  CVS:   Regular rhythm. No edema  Abd:  Soft, not distended, not tender  Neuro:  Alert, oriented x3, no focal deficits, cochlear implants+  _______________________________________________________________________  DISCHARGE MEDICATIONS:   Current Discharge Medication List      START taking these medications    Details   aspirin 81 mg chewable tablet Take 1 Tab by mouth daily. Qty: 30 Tab, Refills: 0      meclizine (ANTIVERT) 25 mg tablet Take 1 Tab by mouth three (3) times daily as needed for up to 10 days. Qty: 40 Tab, Refills: 0         CONTINUE these medications which have NOT CHANGED    Details   acyclovir (ZOVIRAX) 400 mg tablet Take 400 mg by mouth daily. omeprazole (PRILOSEC) 40 mg capsule Take 40 mg by mouth daily. vit C/E/Zn/coppr/lutein/zeaxan (PRESERVISION AREDS 2 PO) Take 1 Cap by mouth daily. atorvastatin (LIPITOR) 20 mg tablet Take 20 mg by mouth daily. My Recommended Diet, Activity, Wound Care, and follow-up labs are listed in the patient's Discharge Insturctions which I have personally completed and reviewed.     ______________________________________________________________________    Risk of deterioration: Low    Condition at Discharge:  Stable  ______________________________________________________________________    Disposition  Home with family and home health services  ______________________________________________________________________    Care Plan discussed with:   Patient, Family, RN, Care Manager, Consultant    ______________________________________________________________________    Code Status: Full Code  ______________________________________________________________________      Follow up with:   PCP : Lorrain Hodgkins, MD  Follow-up Information     Follow up With Details Comments [facility] Schedule an appointment as soon as possible for a visit today this is your outpatient physical therapy provider. [address] [place] [address]  [phone]    [facility] On 9/12/2018 arrive at 8:45 AM for 9 AM appointment with your PCP, Dr. Radha Davis for post hospital follow up appointment.     [address] [facility]  [phone]    Elier Machado, [address] [place] [id number]  [phone]                Total time in minutes spent coordinating this discharge (includes going over instructions, follow-up, prescriptions, and preparing report for sign off to her PCP) :  35 minutes    Signed:  Elidia Matamoros MD

## 2018-09-06 NOTE — PROGRESS NOTES
Discharge instructions reviewed with patient. All questions answered. Patient signature obtained. Patient advised that his ride would be here in 30 minutes.

## 2018-09-06 NOTE — PROGRESS NOTES
Problem: Falls - Risk of 
Goal: *Absence of Falls Document Darrell Stiles Fall Risk and appropriate interventions in the flowsheet. Outcome: Not Progressing Towards Goal 
Fall Risk Interventions: 
Mobility Interventions: Bed/chair exit alarm, Patient to call before getting OOB Mentation Interventions: Bed/chair exit alarm, Door open when patient unattended Medication Interventions: Bed/chair exit alarm, Evaluate medications/consider consulting pharmacy, Patient to call before getting OOB

## 2018-09-06 NOTE — PROGRESS NOTES
Pt is discharged and family will transport pt home by car. F/u appts made and documented on the discharge paperwork. Pt scheduled with [facility] for vestibular PT. Care Management Interventions PCP Verified by CM: Yes 
Palliative Care Criteria Met (RRAT>21 & CHF Dx)?: No 
Mode of Transport at Discharge: Other (see comment) (family will transport pt home by car) Transition of Care Consult (CM Consult): Discharge Planning MyChart Signup: No 
Discharge Durable Medical Equipment: No 
Physical Therapy Consult: Yes Occupational Therapy Consult: Yes Speech Therapy Consult: No 
Current Support Network: Own Home, Lives Alone, Family Lives [place] Confirm Follow Up Transport: Family Plan discussed with Pt/Family/Caregiver: Yes Freedom of Choice Offered: Yes Discharge Location Discharge Placement: Home with outpatient services Jamari Doll, [address]

## 2018-09-06 NOTE — DISCHARGE INSTRUCTIONS
Vertigo: Care Instructions  Your Care Instructions    Vertigo is the feeling that you or your surroundings are moving when there is no actual movement. It is often described as a feeling of spinning, whirling, falling, or tilting. Vertigo may make you vomit or feel nauseated. You may have trouble standing or walking and may lose your balance. Vertigo is often related to an inner ear problem, but it can have other more serious causes. If vertigo continues, you may need more tests to find its cause. Follow-up care is a key part of your treatment and safety. Be sure to make and go to all appointments, and call your doctor if you are having problems. It's also a good idea to know your test results and keep a list of the medicines you take. How can you care for yourself at home? · Do not lie flat on your back. Prop yourself up slightly. This may reduce the spinning feeling. Keep your eyes open. · Move slowly so that you do not fall. · If your doctor recommends medicine, take it exactly as directed. · Do not drive while you are having vertigo. Certain exercises, called Razo-Daroff exercises, can help decrease vertigo. To do Razo-Daroff exercises:  · Sit on the edge of a bed or sofa and quickly lie down on the side that causes the worst vertigo. Lie on your side with your ear down. · Stay in this position for at least 30 seconds or until the vertigo goes away. · Sit up. If this causes vertigo, wait for it to stop. · Repeat the procedure on the other side. · Repeat this 10 times. Do these exercises 2 times a day until the vertigo is gone. When should you call for help? Call 911 anytime you think you may need emergency care. For example, call if:    · You passed out (lost consciousness).     · You have symptoms of a stroke. These may include:  ¨ Sudden numbness, tingling, weakness, or loss of movement in your face, arm, or leg, especially on only one side of your body.   ¨ Sudden vision changes. ¨ Sudden trouble speaking. ¨ Sudden confusion or trouble understanding simple statements. ¨ Sudden problems with walking or balance. ¨ A sudden, severe headache that is different from past headaches.    Call your doctor now or seek immediate medical care if:    · Vertigo occurs with a fever, a headache, or ringing in your ears.     · You have new or increased nausea and vomiting.    Watch closely for changes in your health, and be sure to contact your doctor if:    · Vertigo gets worse or happens more often.     · Vertigo has not gotten better after 2 weeks. Where can you learn more? Go to http://nicolette-eder.info/. Enter F769 in the search box to learn more about \"Vertigo: Care Instructions. \"  Current as of: May 12, 2017  Content Version: 11.7  © 5131-5374 [employer]. Care instructions adapted under license by [employer] (which disclaims liability or warranty for this information). If you have questions about a medical condition or this instruction, always ask your healthcare professional. [place] [address] any warranty or liability for your use of this information.

## 2018-09-06 NOTE — PROGRESS NOTES
Bedside shift change report given to Ashley/ Juan Manuel Lau RN by Lamine Isaacs RN. Report included the following information SBAR, Kardex and Recent Results. 
  
Zone Phone:   [phone] 
  
  
Significant changes during shift:  None 
  
  
  
Patient Information 
  
Dimitris Hines 70 y.o. 
9/4/2018  8:54 AM by Jerry Ortega MD. Dimitris Hines was admitted from Home 
  
Problem List 
  
    
Patient Active Problem List  
  Diagnosis Date Noted  Vertigo 09/04/2018  Lt groin pain 06/13/2014  
  
    
Past Medical History:  
Diagnosis Date  Hearing loss    
  Left ear complete hearing loss  Hyperlipemia    
 Weight loss    
  as of 6/10/16 pt states ongoing x1 yr and still being evaluated  
  
Activity Status: 
  
OOB to Chair Yes Ambulated this shift Yes Bed Rest No 
  
DVT prophylaxis: 
  
DVT prophylaxis Med- No 
DVT prophylaxis SCD or MICHELE- Yes  
  
Patient Safety: 
  
Falls Score Total Score: 3 Safety Level_______ Bed Alarm On? Yes Sitter?  No 
  
  
  
Discharge Plan: home with outpatient rehab 
  
Active Consults: 
IP CONSULT TO NEUROLOGY

## 2018-09-08 LAB — B BURGDOR IGG+IGM SER-ACNC: <0.91 ISR (ref 0–0.9)

## 2019-03-08 ENCOUNTER — HOSPITAL ENCOUNTER (OUTPATIENT)
Age: 64
Setting detail: OUTPATIENT SURGERY
Discharge: HOME | End: 2019-03-08
Attending: SPECIALIST | Admitting: SPECIALIST
Payer: MEDICARE

## 2019-03-08 ENCOUNTER — ANESTHESIA (OUTPATIENT)
Dept: ENDOSCOPY | Age: 64
End: 2019-03-08
Payer: MEDICARE

## 2019-03-08 ENCOUNTER — ANESTHESIA EVENT (OUTPATIENT)
Dept: ENDOSCOPY | Age: 64
End: 2019-03-08
Payer: MEDICARE

## 2019-03-08 VITALS
WEIGHT: 163.5 LBS | OXYGEN SATURATION: 98 % | HEIGHT: 70 IN | RESPIRATION RATE: 15 BRPM | DIASTOLIC BLOOD PRESSURE: 75 MMHG | BODY MASS INDEX: 23.41 KG/M2 | SYSTOLIC BLOOD PRESSURE: 129 MMHG | TEMPERATURE: 97.7 F | HEART RATE: 84 BPM

## 2019-03-08 PROCEDURE — 76040000019: Performed by: SPECIALIST

## 2019-03-08 PROCEDURE — 74011250636 HC RX REV CODE- 250/636

## 2019-03-08 PROCEDURE — 88305 TISSUE EXAM BY PATHOLOGIST: CPT

## 2019-03-08 PROCEDURE — 76060000031 HC ANESTHESIA FIRST 0.5 HR: Performed by: SPECIALIST

## 2019-03-08 PROCEDURE — 77030019988 HC FCPS ENDOSC DISP BSC -B: Performed by: SPECIALIST

## 2019-03-08 PROCEDURE — 74011250636 HC RX REV CODE- 250/636: Performed by: SPECIALIST

## 2019-03-08 RX ORDER — NALOXONE HYDROCHLORIDE 0.4 MG/ML
0.4 INJECTION, SOLUTION INTRAMUSCULAR; INTRAVENOUS; SUBCUTANEOUS
Status: DISCONTINUED | OUTPATIENT
Start: 2019-03-08 | End: 2019-03-08 | Stop reason: HOSPADM

## 2019-03-08 RX ORDER — SODIUM CHLORIDE 0.9 % (FLUSH) 0.9 %
5-40 SYRINGE (ML) INJECTION AS NEEDED
Status: DISCONTINUED | OUTPATIENT
Start: 2019-03-08 | End: 2019-03-08 | Stop reason: HOSPADM

## 2019-03-08 RX ORDER — SODIUM CHLORIDE 9 MG/ML
50 INJECTION, SOLUTION INTRAVENOUS CONTINUOUS
Status: DISCONTINUED | OUTPATIENT
Start: 2019-03-08 | End: 2019-03-08 | Stop reason: HOSPADM

## 2019-03-08 RX ORDER — DEXTROMETHORPHAN/PSEUDOEPHED 2.5-7.5/.8
1.2 DROPS ORAL
Status: DISCONTINUED | OUTPATIENT
Start: 2019-03-08 | End: 2019-03-08 | Stop reason: HOSPADM

## 2019-03-08 RX ORDER — SODIUM CHLORIDE 0.9 % (FLUSH) 0.9 %
5-40 SYRINGE (ML) INJECTION EVERY 8 HOURS
Status: DISCONTINUED | OUTPATIENT
Start: 2019-03-08 | End: 2019-03-08 | Stop reason: HOSPADM

## 2019-03-08 RX ORDER — EPINEPHRINE 0.1 MG/ML
1 INJECTION INTRACARDIAC; INTRAVENOUS
Status: DISCONTINUED | OUTPATIENT
Start: 2019-03-08 | End: 2019-03-08 | Stop reason: HOSPADM

## 2019-03-08 RX ORDER — LIDOCAINE HYDROCHLORIDE 20 MG/ML
INJECTION, SOLUTION EPIDURAL; INFILTRATION; INTRACAUDAL; PERINEURAL AS NEEDED
Status: DISCONTINUED | OUTPATIENT
Start: 2019-03-08 | End: 2019-03-08 | Stop reason: HOSPADM

## 2019-03-08 RX ORDER — PROPOFOL 10 MG/ML
INJECTION, EMULSION INTRAVENOUS AS NEEDED
Status: DISCONTINUED | OUTPATIENT
Start: 2019-03-08 | End: 2019-03-08 | Stop reason: HOSPADM

## 2019-03-08 RX ORDER — FLUMAZENIL 0.1 MG/ML
0.2 INJECTION INTRAVENOUS
Status: DISCONTINUED | OUTPATIENT
Start: 2019-03-08 | End: 2019-03-08 | Stop reason: HOSPADM

## 2019-03-08 RX ORDER — ATROPINE SULFATE 0.1 MG/ML
0.5 INJECTION INTRAVENOUS
Status: DISCONTINUED | OUTPATIENT
Start: 2019-03-08 | End: 2019-03-08 | Stop reason: HOSPADM

## 2019-03-08 RX ADMIN — PROPOFOL 150 MG: 10 INJECTION, EMULSION INTRAVENOUS at 09:27

## 2019-03-08 RX ADMIN — SODIUM CHLORIDE 50 ML/HR: 900 INJECTION, SOLUTION INTRAVENOUS at 08:00

## 2019-03-08 RX ADMIN — LIDOCAINE HYDROCHLORIDE 100 MG: 20 INJECTION, SOLUTION EPIDURAL; INFILTRATION; INTRACAUDAL; PERINEURAL at 09:18

## 2019-03-08 NOTE — PROCEDURES
Esophagogastroduodenoscopy Procedure Note      Subha Leonardo  1955  [id number]    Indication:  H/O Chavez's Esophagus     Endoscopist: Jose Antonio Sotelo MD    Referring Provider:  Samara Shin MD    Sedation:  MAC anesthesia Propofol    Procedure Details:  After infomed consent was obtained for the procedure, with all risks and benefits of procedure explained the patient was taken to the endoscopy suite and placed in the left lateral decubitus position. Following sequential administration of sedation as per above, the endoscope was inserted into the mouth and advanced under direct vision to second portion of the duodenum. A careful inspection was made as the gastroscope was withdrawn, including a retroflexed view of the proximal stomach; findings and interventions are described below. Findings:     Esophagus:   + There was normal mucosa throughout the entire esophagus. Lumen patent.  + There was a normal Z line located at 39 cm from the incisors s/p Bx. Stomach:   - Normal gastric mucosa, no erosions/ulcers seen. Duodenum:   - The bulb and post bulbar mucosa is normal in appearance to the second portion. The duodenal folds appeared normal.      Therapies:  As above    Specimen: Specimens were collected as described and send to the laboratory. Complications:   None were encountered during the procedure. EBL: < 10 ml.           Recommendations:   -f/u path  -repeat EGD in 3 years  Jose Antonio Sotelo MD  3/8/2019  9:28 AM

## 2019-03-08 NOTE — DISCHARGE INSTRUCTIONS
Christiana Douglas  [id number]  1955    EGD DISCHARGE INSTRUCTIONS  Discomfort:  Sore throat- throat lozenges or warm salt water gargle  redness at IV site- apply warm compress to area; if redness or soreness persist- contact your physician  Gaseous discomfort- walking, belching will help relieve any discomfort  You may not operate a vehicle for 12 hours  You may not engage in an occupation involving machinery or appliances for rest of today. You may not drink alcoholic beverages for at least 12 hours  Avoid making any critical decisions for at least 24 hour  DIET  You may resume your regular diet - however -  remember your colon is empty and a heavy meal will produce gas. Avoid these foods:  vegetables, fried / greasy foods, carbonated drinks  MEDICATIONS        Regarding Aspirin or Nonsteroidal medications specifically, please see below. ACTIVITY  You may resume your normal daily activities. Spend the remainder of the day resting -  avoid any strenuous activity. CALL M.D. ANY SIGN OF   Increasing pain, nausea, vomiting  Abdominal distension (swelling)  New increased bleeding (oral or rectal)  Fever (chills)  Pain in chest area  Bloody discharge from nose or mouth  Shortness of breath    ONLY  Tylenol as needed for pain.     Follow-up Instructions:   Call Dr. Nannette Leonard for results of procedure / biopsy in 4-5 days at telephone #  [phone]              Repeat EGD in 3 years

## 2019-03-08 NOTE — H&P
Gastroenterology Outpatient History and Physical    Patient: Macarena Sprain    Physician: Marciano Mayer MD    Vital Signs: Blood pressure 135/76, pulse 91, temperature 97.6 °F (36.4 °C), resp. rate 14, height 5' 10\" (1.778 m), weight 74.2 kg (163 lb 8 oz), SpO2 98 %. Allergies: No Known Allergies    Chief Complaint: Chavez's Esophagus    History of Present Illness: 71 yo WM for EGD for h/o Chavez's Esophagus. Justification for Procedure: above    History:  Past Medical History:   Diagnosis Date    GERD (gastroesophageal reflux disease)     Hearing loss     Left ear complete hearing loss    Hyperlipemia     Ill-defined condition     vertigo    Weight loss     as of 6/10/16 pt states ongoing x1 yr and still being evaluated      Past Surgical History:   Procedure Laterality Date    HX CATARACT REMOVAL Bilateral 06/14/2016    HX HERNIA REPAIR Bilateral 2010    Bilateral Inguinal Hernia Repair with Mesh    HX OTHER SURGICAL Bilateral 2016    cochlear implants    NEUROLOGICAL PROCEDURE UNLISTED  approx 2010    Excision Cyst from [address] in [place]      Social History     Socioeconomic History    Marital status: SINGLE     Spouse name: Not on file    Number of children: Not on file    Years of education: Not on file    Highest education level: Not on file   Tobacco Use    Smoking status: Never Smoker    Smokeless tobacco: Never Used   Substance and Sexual Activity    Alcohol use: Yes     Alcohol/week: 1.8 oz     Types: 3 Shots of liquor per week     Comment: 3 mixed drinks per week as of 3/7/19    Drug use: No      Family History   Problem Relation Age of Onset    No Known Problems Mother     Heart Disease Father     No Known Problems Sister     Kidney Disease Brother     Heart Disease Brother        Medications:   Prior to Admission medications    Medication Sig Start Date End Date Taking? Authorizing Provider   acyclovir (ZOVIRAX) 400 mg tablet Take 400 mg by mouth daily.    Yes Other, MD Vaishali   omeprazole (PRILOSEC) 40 mg capsule Take 40 mg by mouth daily. Yes Other, MD Vaishali   vit C/E/Zn/coppr/lutein/zeaxan (PRESERVISION AREDS 2 PO) Take 1 Cap by mouth daily. Yes Other, MD Vaishali   atorvastatin (LIPITOR) 20 mg tablet Take 20 mg by mouth daily. Yes Provider, Historical   aspirin 81 mg chewable tablet Take 1 Tab by mouth daily. 9/7/18   Breanna Noonan MD       Physical Exam:   General: alert, no distress   HEENT: Head: Normocephalic, no lesions, without obvious abnormality.    Heart: regular rate and rhythm, S1, S2 normal, no murmur, click, rub or gallop   Lungs: chest clear, no wheezing, rales, normal symmetric air entry   Abdominal: soft, NT/ND +BS    Neurological: Grossly normal   Extremities: extremities normal, atraumatic, no cyanosis or edema     Findings/Diagnosis: Chavez's Esophagus    Plan of Care/Planned Procedure: EGD    Signed By: Jose Dow MD     March 8, 2019

## 2019-03-08 NOTE — ANESTHESIA PREPROCEDURE EVALUATION
Anesthetic History   No history of anesthetic complications            Review of Systems / Medical History  Patient summary reviewed, nursing notes reviewed and pertinent labs reviewed    Pulmonary  Within defined limits                 Neuro/Psych   Within defined limits           Cardiovascular              Hyperlipidemia    Exercise tolerance: >4 METS     GI/Hepatic/Renal  Within defined limits              Endo/Other  Within defined limits           Other Findings   Comments: Deaf left ear  Hx vertigo           Physical Exam    Airway  Mallampati: II  TM Distance: < 4 cm  Neck ROM: normal range of motion   Mouth opening: Normal    Comments: Very anterior Cardiovascular    Rhythm: regular  Rate: normal        Comments: Some ectopy Dental    Dentition: Full lower dentures, Full upper dentures and Edentulous     Pulmonary  Breath sounds clear to auscultation               Abdominal  GI exam deferred       Other Findings            Anesthetic Plan    ASA: 2  Anesthesia type: total IV anesthesia          Induction: Intravenous  Anesthetic plan and risks discussed with: Patient

## 2019-03-08 NOTE — PERIOP NOTES
Anesthesia reports 150mg Propofol, 100mg Lidocaine and 150mL NS given during procedure. Received report from anesthesia staff on vital signs and status of patient.

## 2019-03-08 NOTE — ROUTINE PROCESS
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 Karissa Flynn  Assistant(s): Endoscopy Technician-1: Tabatha DYE  Endoscopy RN-1: Haim Wang RN    Specimens:   ID Type Source Tests Collected by Time Destination   1 : g e junction biopsy Preservative   Marce Porras MD 3/8/2019 7778 Pathology     H. Pylori  no    Assessment:  Intra-procedure medications   Anesthesia gave intra-procedure sedation and medications, see anesthesia flow sheet yes    Intravenous fluids: NS@ KVO     Vital signs stable     Abdominal assessment: round and soft     Recommendation:  Discharge patient per MD order  Family or Schaeffer Eans, daughter  Permission to share finding with family or friend yes

## 2019-03-08 NOTE — ANESTHESIA POSTPROCEDURE EVALUATION
Procedure(s):  ESOPHAGOGASTRODUODENOSCOPY (EGD)  ESOPHAGOGASTRODUODENAL (EGD) BIOPSY. Anesthesia Post Evaluation        Patient location during evaluation: PACU  Note status: Adequate. Level of consciousness: responsive to verbal stimuli and sleepy but conscious  Pain management: satisfactory to patient  Airway patency: patent  Anesthetic complications: no  Cardiovascular status: acceptable  Respiratory status: acceptable  Hydration status: acceptable  Comments: +Post-Anesthesia Evaluation and Assessment    Patient: Vinod Wang MRN: [id number]  SSN: [id number]   YOB: 1955  Age: 70 y.o. Sex: male      Cardiovascular Function/Vital Signs    BP (P) 106/67   Pulse (P) 86   Temp 36.4 °C (97.6 °F)   Resp (P) 16   Ht 5' 10\" (1.778 m)   Wt 74.2 kg (163 lb 8 oz)   SpO2 (P) 98%   BMI 23.46 kg/m²     Patient is status post Procedure(s):  ESOPHAGOGASTRODUODENOSCOPY (EGD)  ESOPHAGOGASTRODUODENAL (EGD) BIOPSY. Nausea/Vomiting: Controlled. Postoperative hydration reviewed and adequate. Pain:  Pain Scale 1: Numeric (0 - 10) (03/08/19 0751)  Pain Intensity 1: 0 (03/08/19 0751)   Managed. Neurological Status: At baseline. Mental Status and Level of Consciousness: Arousable. Pulmonary Status:   O2 Device: (P) CO2 nasal cannula (03/08/19 0931)   Adequate oxygenation and airway patent. Complications related to anesthesia: None    Post-anesthesia assessment completed. No concerns.     Signed By: Jennie James MD    3/8/2019  Post anesthesia nausea and vomiting:  controlled      Visit Vitals  BP (P) 106/67   Pulse (P) 86   Temp 36.4 °C (97.6 °F)   Resp (P) 16   Ht 5' 10\" (1.778 m)   Wt 74.2 kg (163 lb 8 oz)   SpO2 (P) 98%   BMI 23.46 kg/m²

## 2020-12-25 ENCOUNTER — APPOINTMENT (OUTPATIENT)
Dept: GENERAL RADIOLOGY | Age: 65
End: 2020-12-25
Attending: EMERGENCY MEDICINE
Payer: MEDICARE

## 2020-12-25 ENCOUNTER — HOSPITAL ENCOUNTER (EMERGENCY)
Age: 65
Discharge: HOME | End: 2020-12-26
Attending: EMERGENCY MEDICINE
Payer: MEDICARE

## 2020-12-25 DIAGNOSIS — R07.9 CHEST PAIN, UNSPECIFIED TYPE: Primary | ICD-10-CM

## 2020-12-25 LAB
ALBUMIN SERPL-MCNC: 3.6 G/DL (ref 3.5–5)
ALBUMIN/GLOB SERPL: 1.1 {RATIO} (ref 1.1–2.2)
ALP SERPL-CCNC: 86 U/L (ref 45–117)
ALT SERPL-CCNC: 19 U/L (ref 12–78)
ANION GAP SERPL CALC-SCNC: 4 MMOL/L (ref 5–15)
AST SERPL-CCNC: 16 U/L (ref 15–37)
BASOPHILS # BLD: 0 K/UL (ref 0–0.1)
BASOPHILS NFR BLD: 0 % (ref 0–1)
BILIRUB SERPL-MCNC: 0.7 MG/DL (ref 0.2–1)
BUN SERPL-MCNC: 16 MG/DL (ref 6–20)
BUN/CREAT SERPL: 13 (ref 12–20)
CALCIUM SERPL-MCNC: 8.9 MG/DL (ref 8.5–10.1)
CHLORIDE SERPL-SCNC: 107 MMOL/L (ref 97–108)
CO2 SERPL-SCNC: 28 MMOL/L (ref 21–32)
CREAT SERPL-MCNC: 1.23 MG/DL (ref 0.7–1.3)
DIFFERENTIAL METHOD BLD: ABNORMAL
EOSINOPHIL # BLD: 0.1 K/UL (ref 0–0.4)
EOSINOPHIL NFR BLD: 1 % (ref 0–7)
ERYTHROCYTE [DISTWIDTH] IN BLOOD BY AUTOMATED COUNT: 12.8 % (ref 11.5–14.5)
GLOBULIN SER CALC-MCNC: 3.4 G/DL (ref 2–4)
GLUCOSE SERPL-MCNC: 116 MG/DL (ref 65–100)
HCT VFR BLD AUTO: 40 % (ref 36.6–50.3)
HGB BLD-MCNC: 13.6 G/DL (ref 12.1–17)
IMM GRANULOCYTES # BLD AUTO: 0 K/UL (ref 0–0.04)
IMM GRANULOCYTES NFR BLD AUTO: 0 % (ref 0–0.5)
LYMPHOCYTES # BLD: 0.7 K/UL (ref 0.8–3.5)
LYMPHOCYTES NFR BLD: 13 % (ref 12–49)
MCH RBC QN AUTO: 30.6 PG (ref 26–34)
MCHC RBC AUTO-ENTMCNC: 34 G/DL (ref 30–36.5)
MCV RBC AUTO: 89.9 FL (ref 80–99)
MONOCYTES # BLD: 0.4 K/UL (ref 0–1)
MONOCYTES NFR BLD: 8 % (ref 5–13)
NEUTS SEG # BLD: 4.4 K/UL (ref 1.8–8)
NEUTS SEG NFR BLD: 78 % (ref 32–75)
NRBC # BLD: 0 K/UL (ref 0–0.01)
NRBC BLD-RTO: 0 PER 100 WBC
PLATELET # BLD AUTO: 178 K/UL (ref 150–400)
PMV BLD AUTO: 9.8 FL (ref 8.9–12.9)
POTASSIUM SERPL-SCNC: 3.5 MMOL/L (ref 3.5–5.1)
PROT SERPL-MCNC: 7 G/DL (ref 6.4–8.2)
RBC # BLD AUTO: 4.45 M/UL (ref 4.1–5.7)
RBC MORPH BLD: ABNORMAL
SODIUM SERPL-SCNC: 139 MMOL/L (ref 136–145)
TROPONIN I SERPL-MCNC: <0.05 NG/ML
WBC # BLD AUTO: 5.6 K/UL (ref 4.1–11.1)

## 2020-12-25 PROCEDURE — 85025 COMPLETE CBC W/AUTO DIFF WBC: CPT

## 2020-12-25 PROCEDURE — 84484 ASSAY OF TROPONIN QUANT: CPT

## 2020-12-25 PROCEDURE — 71045 X-RAY EXAM CHEST 1 VIEW: CPT

## 2020-12-25 PROCEDURE — 99285 EMERGENCY DEPT VISIT HI MDM: CPT

## 2020-12-25 PROCEDURE — 93005 ELECTROCARDIOGRAM TRACING: CPT

## 2020-12-25 PROCEDURE — 36415 COLL VENOUS BLD VENIPUNCTURE: CPT

## 2020-12-25 PROCEDURE — 80053 COMPREHEN METABOLIC PANEL: CPT

## 2020-12-26 VITALS
DIASTOLIC BLOOD PRESSURE: 69 MMHG | HEART RATE: 75 BPM | RESPIRATION RATE: 15 BRPM | OXYGEN SATURATION: 96 % | HEIGHT: 70 IN | BODY MASS INDEX: 23.13 KG/M2 | WEIGHT: 161.6 LBS | SYSTOLIC BLOOD PRESSURE: 130 MMHG | TEMPERATURE: 97.6 F

## 2020-12-26 LAB — TROPONIN I SERPL-MCNC: <0.05 NG/ML

## 2020-12-26 NOTE — ED NOTES
MD Rocio Richey reviewed discharge instructions with the patient. The patient verbalized understanding. Patient discharged home with stable vitals. Patient out of ED ambulatory with discharge instructions in hand. Opportunity for questions and clarification provided. No further complaints noted at this time.

## 2020-12-26 NOTE — ED PROVIDER NOTES
EMERGENCY DEPARTMENT HISTORY AND PHYSICAL EXAM      Date: 12/25/2020  Patient Name: Singh Reyes    History of Presenting Illness     Chief Complaint   Patient presents with    Chest Pain     left sided x2-3 days       History Provided By: Patient    HPI: Singh Reyes, 77 y.o. male  With past medical history of hearing loss with cochlear implants, GERD, hyperlipidemia presenting today with left-sided chest pain. The patient states that he has been having intermittent chest pain for the past 3 days. He says that it is on the left side of his chest and feel like a pressure sensation. He denies radiation, but he does state that he had an episode of left arm tingling in the upper arm yesterday. He says that he is under some significant stress and life and notes that his sister was recently diagnosed with a brain tumor. The patient denies any personal history of CAD or heart disease, but he has a family history of this. Denies smoking. No recent fever. He does note that he has had a nonproductive cough for about 1 week. There are no other complaints, changes, or physical findings at this time. PCP: Gene Tineo MD    No current facility-administered medications on file prior to encounter. Current Outpatient Medications on File Prior to Encounter   Medication Sig Dispense Refill    aspirin 81 mg chewable tablet Take 1 Tab by mouth daily. 30 Tab 0    acyclovir (ZOVIRAX) 400 mg tablet Take 400 mg by mouth daily.  omeprazole (PRILOSEC) 40 mg capsule Take 40 mg by mouth daily.  vit C/E/Zn/coppr/lutein/zeaxan (PRESERVISION AREDS 2 PO) Take 1 Cap by mouth daily.  atorvastatin (LIPITOR) 20 mg tablet Take 20 mg by mouth daily.          Past History     Past Medical History:  Past Medical History:   Diagnosis Date    GERD (gastroesophageal reflux disease)     Hearing loss     Left ear complete hearing loss    Hyperlipemia     Ill-defined condition     vertigo    Weight loss     as of 6/10/16 pt states ongoing x1 yr and still being evaluated       Past Surgical History:  Past Surgical History:   Procedure Laterality Date    HX CATARACT REMOVAL Bilateral 06/14/2016    HX HERNIA REPAIR Bilateral 2010    Bilateral Inguinal Hernia Repair with Mesh    HX OTHER SURGICAL Bilateral 2016    cochlear implants    NEUROLOGICAL PROCEDURE UNLISTED  approx 2010    Excision Cyst from [address] in [place]       Family History:  Family History   Problem Relation Age of Onset    No Known Problems Mother     Heart Disease Father     No Known Problems Sister     Kidney Disease Brother     Heart Disease Brother        Social History:  Social History     Tobacco Use    Smoking status: Never Smoker    Smokeless tobacco: Never Used   Substance Use Topics    Alcohol use: Yes     Alcohol/week: 3.0 standard drinks     Types: 3 Shots of liquor per week     Comment: 3 mixed drinks per week as of 3/7/19    Drug use: No       Allergies:  No Known Allergies      Review of Systems   Constitutional: No  fever  Skin: No  rash  HEENT: No  nasal congestion  Resp: No cough  CV: + chest pain  GI: No vomiting  : No dysuria  MSK: No joint pain  Neuro: No numbness  Psych: No anxiety      Physical Exam     Patient Vitals for the past 12 hrs:   Temp Pulse Resp BP SpO2   12/26/20 0015 97.6 °F (36.4 °C) 75 15 130/69 96 %   12/26/20 0000  74 17 127/70 96 %   12/25/20 2330  74 17 139/69 95 %   12/25/20 2300  73 20 (!) 140/66 95 %   12/25/20 2230  84 17 132/71 97 %   12/25/20 2110 97.4 °F (36.3 °C) 99 18 (!) 160/76 99 %     General: alert, No acute distress  Eyes: EOMI, normal conjunctiva  ENT: moist mucous membranes. Cochlear implants in place bilaterally  Neck: Active, full ROM of neck. Skin: No rashes. no jaundice              Lungs: Equal chest expansion. no respiratory distress.  clear to auscultation bilaterally No accessory muscle usage  Heart: regular rate     no peripheral edema   2+ radial pulses and DPs bilaterally  Abd:  non distended soft, nontender. No rebound tenderness. No guarding  Back: Full ROM  MSK: Full, active ROM in all 4 extremities. Neuro: Alert and oriented to Person, Place, Time and Situation; normal speech;   Psych: Cooperative with exam; Appropriate mood and affect             Diagnostic Study Results     Labs -     Recent Results (from the past 12 hour(s))   EKG, 12 LEAD, INITIAL    Collection Time: 12/25/20  9:15 PM   Result Value Ref Range    Ventricular Rate 94 BPM    Atrial Rate 94 BPM    P-R Interval 148 ms    QRS Duration 102 ms    Q-T Interval 358 ms    QTC Calculation (Bezet) 447 ms    Calculated P Axis 50 degrees    Calculated R Axis -43 degrees    Calculated T Axis 35 degrees    Diagnosis       Sinus rhythm with occasional premature ventricular complexes  Possible Left atrial enlargement  Left axis deviation  Left ventricular hypertrophy  When compared with ECG of 04-SEP-2018 08:59,  premature ventricular complexes are now present     TROPONIN I    Collection Time: 12/25/20  9:37 PM   Result Value Ref Range    Troponin-I, Qt. <0.05 <0.05 ng/mL   CBC WITH AUTOMATED DIFF    Collection Time: 12/25/20  9:37 PM   Result Value Ref Range    WBC 5.6 4.1 - 11.1 K/uL    RBC 4.45 4.10 - 5.70 M/uL    HGB 13.6 12.1 - 17.0 g/dL    HCT 40.0 36.6 - 50.3 %    MCV 89.9 80.0 - 99.0 FL    MCH 30.6 26.0 - 34.0 PG    MCHC 34.0 30.0 - 36.5 g/dL    RDW 12.8 11.5 - 14.5 %    PLATELET 273 836 - 081 K/uL    MPV 9.8 8.9 - 12.9 FL    NRBC 0.0 0  WBC    ABSOLUTE NRBC 0.00 0.00 - 0.01 K/uL    NEUTROPHILS 78 (H) 32 - 75 %    LYMPHOCYTES 13 12 - 49 %    MONOCYTES 8 5 - 13 %    EOSINOPHILS 1 0 - 7 %    BASOPHILS 0 0 - 1 %    IMMATURE GRANULOCYTES 0 0.0 - 0.5 %    ABS. NEUTROPHILS 4.4 1.8 - 8.0 K/UL    ABS. LYMPHOCYTES 0.7 (L) 0.8 - 3.5 K/UL    ABS. MONOCYTES 0.4 0.0 - 1.0 K/UL    ABS. EOSINOPHILS 0.1 0.0 - 0.4 K/UL    ABS. BASOPHILS 0.0 0.0 - 0.1 K/UL    ABS. IMM.  GRANS. 0.0 0.00 - 0.04 K/UL    DF SMEAR SCANNED RBC COMMENTS NORMOCYTIC, NORMOCHROMIC     METABOLIC PANEL, COMPREHENSIVE    Collection Time: 12/25/20  9:37 PM   Result Value Ref Range    Sodium 139 136 - 145 mmol/L    Potassium 3.5 3.5 - 5.1 mmol/L    Chloride 107 97 - 108 mmol/L    CO2 28 21 - 32 mmol/L    Anion gap 4 (L) 5 - 15 mmol/L    Glucose 116 (H) 65 - 100 mg/dL    BUN 16 6 - 20 MG/DL    Creatinine 1.23 0.70 - 1.30 MG/DL    BUN/Creatinine ratio 13 12 - 20      GFR est AA >60 >60 ml/min/1.73m2    GFR est non-AA 59 (L) >60 ml/min/1.73m2    Calcium 8.9 8.5 - 10.1 MG/DL    Bilirubin, total 0.7 0.2 - 1.0 MG/DL    ALT (SGPT) 19 12 - 78 U/L    AST (SGOT) 16 15 - 37 U/L    Alk. phosphatase 86 45 - 117 U/L    Protein, total 7.0 6.4 - 8.2 g/dL    Albumin 3.6 3.5 - 5.0 g/dL    Globulin 3.4 2.0 - 4.0 g/dL    A-G Ratio 1.1 1.1 - 2.2     TROPONIN I    Collection Time: 12/25/20 11:20 PM   Result Value Ref Range    Troponin-I, Qt. <0.05 <0.05 ng/mL       Radiologic Studies -   XR CHEST PORT   Final Result   IMPRESSION:   No acute process identified. .               CT Results  (Last 48 hours)    None        CXR Results  (Last 48 hours)               12/25/20 2215  XR CHEST PORT Final result    Impression:  IMPRESSION:   No acute process identified. .                Narrative:  Clinical history: CP   INDICATION:   CP   COMPARISON: None       FINDINGS:   AP portable upright view of the chest demonstrates a stable  cardiopericardial   silhouette. There is no pleural effusion. .There is no focal consolidation. .There   is no pneumothorax. . Patient is on a cardiac monitor. Medical Decision Making   I am the first provider for this patient. I reviewed the vital signs, available nursing notes, past medical history, past surgical history, family history and social history. Vital Signs-Reviewed the patient's vital signs.   Patient Vitals for the past 12 hrs:   Temp Pulse Resp BP SpO2   12/26/20 0015 97.6 °F (36.4 °C) 75 15 130/69 96 %   12/26/20 0000  74 17 127/70 96 %   12/25/20 2330  74 17 139/69 95 %   12/25/20 2300  73 20 (!) 140/66 95 %   12/25/20 2230  84 17 132/71 97 %   12/25/20 2110 97.4 °F (36.3 °C) 99 18 (!) 160/76 99 %       Pulse Oximetry Analysis - 99% on room air  -  Interpretation: Normal    Cardiac Monitor:   Rate: 99 bpm  Rhythm: Normal Sinus Rhythm      Provider Notes (Medical Decision Making):     Differential Diagnosis: ACS, electrolyte arrangement, pneumonia, pneumothorax, anxiety, GERD, gastritis    Initial Plan: We will obtain laboratory studies, chest x-ray, EKG. The patient is in no acute distress, intermittently tachycardic, does appear slightly anxious, with no other concerning findings on exam.  Will reassess after work-up. ED Course:   Initial assessment performed. The patients presenting problems have been discussed, and they are in agreement with the care plan formulated and outlined with them. I have encouraged them to ask questions as they arise throughout their visit. ED Course as of Dec 26 0617   Fri Dec 25, 2020   2203 On my interpretation of the patient's EKG sinus rhythm at a rate of 94, QTc is 447, PVCs, no ST elevation or depression.    [NW]   2227 On my interpretation of the patient's chest x-ray no evidence of infiltrate or pneumonia. [NW]   3181 On my interpretation of the patient's laboratory studies no significant abnormality, troponin negative. [NW]   Sat Dec 26, 2020   0031 Patient's repeat troponin is negative. [NW]   [id number] Patient presents today with chest discomfort of the past several days. His work-up is negative with a negative chest x-ray, negative troponin and delta troponin as well as normal blood electrolytes and CBC. Ultimately discharged with return precautions. We discussed that this could be related to anxiety, GERD, gastritis and he should follow-up with primary care provider and possibly cardiology. Patient understands and is comfortable with plan for discharge.     [NW]      ED Course User Index  [NW] Gladys Petersen MD       I, Garo Trimble MD, am the attending of record for this patient encounter. Dispo: Discharged. The patient has been re-evaluated and is ready for discharge. Reviewed available results with patient. Counseled patient on diagnosis and care plan. Patient has expressed understanding, and all questions have been answered. Patient agrees with plan and agrees to follow up as recommended, or to return to the ED if their symptoms worsen. Discharge instructions have been provided and explained to the patient, along with reasons to return to the ED. PLAN:  Discharge Medication List as of 12/26/2020 12:33 AM        2. Follow-up Information     Follow up With Specialties Details Why Contact Info    Edel Almanza, [address] [phone]          3. Return to ED if worse       Diagnosis     Clinical Impression:   1. Chest pain, unspecified type        Attestations:    Garo Trimble MD    Please note that this dictation was completed with [employer], the computer voice recognition software. Quite often unanticipated grammatical, syntax, homophones, and other interpretive errors are inadvertently transcribed by the computer software. Please disregard these errors. Please excuse any errors that have escaped final proofreading. Thank you.

## 2020-12-27 LAB
ATRIAL RATE: 94 BPM
CALCULATED P AXIS, ECG09: 50 DEGREES
CALCULATED R AXIS, ECG10: -43 DEGREES
CALCULATED T AXIS, ECG11: 35 DEGREES
DIAGNOSIS, 93000: NORMAL
P-R INTERVAL, ECG05: 148 MS
Q-T INTERVAL, ECG07: 358 MS
QRS DURATION, ECG06: 102 MS
QTC CALCULATION (BEZET), ECG08: 447 MS
VENTRICULAR RATE, ECG03: 94 BPM

## 2022-03-20 PROBLEM — R42 VERTIGO: Status: ACTIVE | Noted: 2018-09-04

## 2023-05-11 RX ORDER — ASPIRIN 81 MG/1
TABLET, CHEWABLE ORAL DAILY
COMMUNITY
Start: 2018-09-07

## 2023-05-11 RX ORDER — ACYCLOVIR 400 MG/1
400 TABLET ORAL DAILY
COMMUNITY

## 2023-05-11 RX ORDER — ATORVASTATIN CALCIUM 20 MG/1
20 TABLET, FILM COATED ORAL DAILY
COMMUNITY

## 2023-05-11 RX ORDER — OMEPRAZOLE 40 MG/1
40 CAPSULE, DELAYED RELEASE ORAL DAILY
COMMUNITY

## 2023-06-24 ENCOUNTER — APPOINTMENT (OUTPATIENT)
Facility: HOSPITAL | Age: 68
End: 2023-06-24
Payer: MEDICARE

## 2023-06-24 ENCOUNTER — HOSPITAL ENCOUNTER (EMERGENCY)
Facility: HOSPITAL | Age: 68
Discharge: HOME | End: 2023-06-24
Attending: EMERGENCY MEDICINE
Payer: MEDICARE

## 2023-06-24 VITALS
HEART RATE: 89 BPM | BODY MASS INDEX: 22.05 KG/M2 | HEIGHT: 70 IN | DIASTOLIC BLOOD PRESSURE: 70 MMHG | TEMPERATURE: 97.7 F | OXYGEN SATURATION: 94 % | WEIGHT: 154 LBS | RESPIRATION RATE: 18 BRPM | SYSTOLIC BLOOD PRESSURE: 110 MMHG

## 2023-06-24 DIAGNOSIS — I50.9 CONGESTIVE HEART FAILURE, UNSPECIFIED HF CHRONICITY, UNSPECIFIED HEART FAILURE TYPE (HCC): Primary | ICD-10-CM

## 2023-06-24 DIAGNOSIS — R06.09 DYSPNEA ON EXERTION: ICD-10-CM

## 2023-06-24 LAB
ALBUMIN SERPL-MCNC: 3.4 G/DL (ref 3.5–5)
ALBUMIN/GLOB SERPL: 1 (ref 1.1–2.2)
ALP SERPL-CCNC: 70 U/L (ref 45–117)
ALT SERPL-CCNC: 21 U/L (ref 12–78)
ANION GAP SERPL CALC-SCNC: 5 MMOL/L (ref 5–15)
AST SERPL-CCNC: 23 U/L (ref 15–37)
BASOPHILS # BLD: 0 K/UL (ref 0–0.1)
BASOPHILS NFR BLD: 1 % (ref 0–1)
BILIRUB SERPL-MCNC: 0.7 MG/DL (ref 0.2–1)
BUN SERPL-MCNC: 23 MG/DL (ref 6–20)
BUN/CREAT SERPL: 20 (ref 12–20)
CALCIUM SERPL-MCNC: 8.4 MG/DL (ref 8.5–10.1)
CHLORIDE SERPL-SCNC: 109 MMOL/L (ref 97–108)
CO2 SERPL-SCNC: 24 MMOL/L (ref 21–32)
CREAT SERPL-MCNC: 1.16 MG/DL (ref 0.7–1.3)
D DIMER PPP FEU-MCNC: 0.5 MG/L FEU (ref 0–0.65)
DIFFERENTIAL METHOD BLD: ABNORMAL
EKG ATRIAL RATE: 108 BPM
EKG DIAGNOSIS: NORMAL
EKG P AXIS: 57 DEGREES
EKG P-R INTERVAL: 168 MS
EKG Q-T INTERVAL: 372 MS
EKG QRS DURATION: 104 MS
EKG QTC CALCULATION (BAZETT): 498 MS
EKG R AXIS: -55 DEGREES
EKG T AXIS: 78 DEGREES
EKG VENTRICULAR RATE: 108 BPM
EOSINOPHIL # BLD: 0.2 K/UL (ref 0–0.4)
EOSINOPHIL NFR BLD: 4 % (ref 0–7)
ERYTHROCYTE [DISTWIDTH] IN BLOOD BY AUTOMATED COUNT: 13 % (ref 11.5–14.5)
GLOBULIN SER CALC-MCNC: 3.4 G/DL (ref 2–4)
GLUCOSE SERPL-MCNC: 101 MG/DL (ref 65–100)
HCT VFR BLD AUTO: 39.4 % (ref 36.6–50.3)
HGB BLD-MCNC: 13.4 G/DL (ref 12.1–17)
IMM GRANULOCYTES # BLD AUTO: 0 K/UL (ref 0–0.04)
IMM GRANULOCYTES NFR BLD AUTO: 0 % (ref 0–0.5)
LYMPHOCYTES # BLD: 0.9 K/UL (ref 0.8–3.5)
LYMPHOCYTES NFR BLD: 21 % (ref 12–49)
MCH RBC QN AUTO: 31 PG (ref 26–34)
MCHC RBC AUTO-ENTMCNC: 34 G/DL (ref 30–36.5)
MCV RBC AUTO: 91.2 FL (ref 80–99)
MONOCYTES # BLD: 0.5 K/UL (ref 0–1)
MONOCYTES NFR BLD: 12 % (ref 5–13)
NEUTS SEG # BLD: 2.5 K/UL (ref 1.8–8)
NEUTS SEG NFR BLD: 62 % (ref 32–75)
NRBC # BLD: 0 K/UL (ref 0–0.01)
NRBC BLD-RTO: 0 PER 100 WBC
NT PRO BNP: 2648 PG/ML
PLATELET # BLD AUTO: 157 K/UL (ref 150–400)
PMV BLD AUTO: 10.9 FL (ref 8.9–12.9)
POTASSIUM SERPL-SCNC: 4.2 MMOL/L (ref 3.5–5.1)
PROT SERPL-MCNC: 6.8 G/DL (ref 6.4–8.2)
RBC # BLD AUTO: 4.32 M/UL (ref 4.1–5.7)
SODIUM SERPL-SCNC: 138 MMOL/L (ref 136–145)
TROPONIN I SERPL HS-MCNC: 46 NG/L (ref 0–76)
WBC # BLD AUTO: 4 K/UL (ref 4.1–11.1)

## 2023-06-24 PROCEDURE — 83880 ASSAY OF NATRIURETIC PEPTIDE: CPT

## 2023-06-24 PROCEDURE — 85379 FIBRIN DEGRADATION QUANT: CPT

## 2023-06-24 PROCEDURE — 99285 EMERGENCY DEPT VISIT HI MDM: CPT

## 2023-06-24 PROCEDURE — 94760 N-INVAS EAR/PLS OXIMETRY 1: CPT

## 2023-06-24 PROCEDURE — 36415 COLL VENOUS BLD VENIPUNCTURE: CPT

## 2023-06-24 PROCEDURE — 71045 X-RAY EXAM CHEST 1 VIEW: CPT

## 2023-06-24 PROCEDURE — 85025 COMPLETE CBC W/AUTO DIFF WBC: CPT

## 2023-06-24 PROCEDURE — 84484 ASSAY OF TROPONIN QUANT: CPT

## 2023-06-24 PROCEDURE — 80053 COMPREHEN METABOLIC PANEL: CPT

## 2023-06-24 PROCEDURE — 71275 CT ANGIOGRAPHY CHEST: CPT

## 2023-06-24 PROCEDURE — 6360000004 HC RX CONTRAST MEDICATION: Performed by: STUDENT IN AN ORGANIZED HEALTH CARE EDUCATION/TRAINING PROGRAM

## 2023-06-24 PROCEDURE — 93005 ELECTROCARDIOGRAM TRACING: CPT | Performed by: EMERGENCY MEDICINE

## 2023-06-24 RX ORDER — FUROSEMIDE 20 MG/1
20 TABLET ORAL 2 TIMES DAILY
Qty: 14 TABLET | Refills: 0 | Status: SHIPPED | OUTPATIENT
Start: 2023-06-24 | End: 2023-07-01

## 2023-06-24 RX ADMIN — IOPAMIDOL 100 ML: 755 INJECTION, SOLUTION INTRAVENOUS at 19:20

## 2023-06-24 NOTE — ED PROVIDER NOTES
AUTO DIFFERENTIAL - Abnormal; Notable for the following components:       Result Value    WBC 4.0 (*)     All other components within normal limits   COMPREHENSIVE METABOLIC PANEL - Abnormal; Notable for the following components:    Chloride 109 (*)     Glucose 101 (*)     BUN 23 (*)     Calcium 8.4 (*)     Albumin 3.4 (*)     Albumin/Globulin Ratio 1.0 (*)     All other components within normal limits   BRAIN NATRIURETIC PEPTIDE - Abnormal; Notable for the following components:    NT Pro-BNP 2,648 (*)     All other components within normal limits   TROPONIN   D-DIMER, QUANTITATIVE          EKG: If performed, independent interpretation documented below in the MDM section     RADIOLOGY:  Non-plain film images such as CT, Ultrasound and MRI are read by the radiologist. Plain radiographic images are visualized and preliminarily interpreted by the ED Provider with the findings documented in the MDM section. Interpretation per the Radiologist below, if available at the time of this note:     [unfilled]   EKG 12-LEAD  XR CHEST PORTABLE  CTA CHEST [facility]   Unless otherwise noted below, none  Procedures     CRITICAL CARE TIME   0    EMERGENCY DEPARTMENT COURSE and DIFFERENTIAL DIAGNOSIS/MDM   Vitals:    Vitals:    06/24/23 1328 06/24/23 1812 06/24/23 1938   BP: 97/83 110/70    Pulse: (!) 113 89    Resp: 18 18    Temp: 97.7 °F (36.5 °C)     TempSrc: Oral     SpO2: 94% 94%    Weight:   69.9 kg (154 lb)   Height:   1.778 m (5' 10\")        Patient was given the following medications:  Medications   iopamidol (ISOVUE-370) 76 % injection 100 mL (100 mLs IntraVENous Given 6/24/23 1920)       Medical Decision Making  Amount and/or Complexity of Data Reviewed  Labs: ordered. Decision-making details documented in ED Course. Radiology: ordered and independent interpretation performed. Decision-making details documented in ED Course. ECG/medicine tests: ordered and independent interpretation performed.

## 2023-06-26 LAB
EKG ATRIAL RATE: 108 BPM
EKG DIAGNOSIS: NORMAL
EKG P AXIS: 57 DEGREES
EKG P-R INTERVAL: 168 MS
EKG Q-T INTERVAL: 372 MS
EKG QRS DURATION: 104 MS
EKG QTC CALCULATION (BAZETT): 498 MS
EKG R AXIS: -55 DEGREES
EKG T AXIS: 78 DEGREES
EKG VENTRICULAR RATE: 108 BPM

## 2023-06-28 LAB
EKG ATRIAL RATE: 110 BPM
EKG DIAGNOSIS: NORMAL
EKG P AXIS: 44 DEGREES
EKG P-R INTERVAL: 194 MS
EKG Q-T INTERVAL: 372 MS
EKG QRS DURATION: 98 MS
EKG QTC CALCULATION (BAZETT): 503 MS
EKG R AXIS: -52 DEGREES
EKG T AXIS: 59 DEGREES
EKG VENTRICULAR RATE: 110 BPM

## 2023-07-28 ENCOUNTER — HOSPITAL ENCOUNTER (OUTPATIENT)
Facility: HOSPITAL | Age: 68
End: 2023-07-28
Attending: STUDENT IN AN ORGANIZED HEALTH CARE EDUCATION/TRAINING PROGRAM
Payer: MEDICARE

## 2023-07-28 VITALS
HEART RATE: 89 BPM | BODY MASS INDEX: 21.38 KG/M2 | DIASTOLIC BLOOD PRESSURE: 65 MMHG | SYSTOLIC BLOOD PRESSURE: 105 MMHG | RESPIRATION RATE: 12 BRPM | WEIGHT: 149 LBS | OXYGEN SATURATION: 94 %

## 2023-07-28 DIAGNOSIS — I35.0 AORTIC STENOSIS: ICD-10-CM

## 2023-07-28 PROCEDURE — 93313 ECHO TRANSESOPHAGEAL: CPT

## 2023-07-28 PROCEDURE — 6360000002 HC RX W HCPCS: Performed by: STUDENT IN AN ORGANIZED HEALTH CARE EDUCATION/TRAINING PROGRAM

## 2023-07-28 PROCEDURE — 6370000000 HC RX 637 (ALT 250 FOR IP): Performed by: STUDENT IN AN ORGANIZED HEALTH CARE EDUCATION/TRAINING PROGRAM

## 2023-07-28 PROCEDURE — 99152 MOD SED SAME PHYS/QHP 5/>YRS: CPT

## 2023-07-28 RX ORDER — LIDOCAINE HYDROCHLORIDE 20 MG/ML
SOLUTION OROPHARYNGEAL PRN
Status: DISCONTINUED | OUTPATIENT
Start: 2023-07-28 | End: 2023-07-28

## 2023-07-28 RX ORDER — MIDAZOLAM HYDROCHLORIDE 1 MG/ML
INJECTION INTRAMUSCULAR; INTRAVENOUS PRN
Status: DISCONTINUED | OUTPATIENT
Start: 2023-07-28 | End: 2023-07-28

## 2023-07-28 RX ORDER — MELOXICAM 15 MG/1
15 TABLET ORAL DAILY
COMMUNITY

## 2023-07-28 RX ORDER — SACUBITRIL AND VALSARTAN 24; 26 MG/1; MG/1
1 TABLET, FILM COATED ORAL 2 TIMES DAILY
COMMUNITY

## 2023-07-28 RX ORDER — FENTANYL CITRATE 50 UG/ML
INJECTION, SOLUTION INTRAMUSCULAR; INTRAVENOUS PRN
Status: DISCONTINUED | OUTPATIENT
Start: 2023-07-28 | End: 2023-07-28

## 2023-07-28 RX ADMIN — FENTANYL CITRATE 25 MCG: 50 INJECTION, SOLUTION INTRAMUSCULAR; INTRAVENOUS at 11:30

## 2023-07-28 RX ADMIN — MIDAZOLAM HYDROCHLORIDE 1 MG: 1 INJECTION, SOLUTION INTRAMUSCULAR; INTRAVENOUS at 11:30

## 2023-07-28 RX ADMIN — FENTANYL CITRATE 50 MCG: 50 INJECTION, SOLUTION INTRAMUSCULAR; INTRAVENOUS at 11:28

## 2023-07-28 RX ADMIN — MIDAZOLAM HYDROCHLORIDE 2 MG: 1 INJECTION, SOLUTION INTRAMUSCULAR; INTRAVENOUS at 11:28

## 2023-07-28 RX ADMIN — LIDOCAINE HYDROCHLORIDE 10 ML: 20 SOLUTION ORAL at 11:20

## 2023-07-28 NOTE — DISCHARGE INSTRUCTIONS
DISCHARGE SUMMARY       The following personal items collected during your admission are returned to you:   Dental Appliance:    Vision:    Hearing Aid:    Jewelry:    Clothing:          PATIENT INSTRUCTIONS: Continue taking all the same medications . You had a transesophageal echocardiogram, your throat was numbed for the procedure, so start with sips of water and advance diet as swallowing returns to normal. Avoid any scalding hot beverages for the next 2 hours. Call to make an appointment with Dr. Ayana Garrison for follow up after you go see gastroenterologist.    What to do at Home:  Recommended activity: No driving today      The discharge information has been reviewed with the PATIENT . The PATIENT  verbalized understanding.

## 2023-07-28 NOTE — PROGRESS NOTES
Pt sedated with 3 mg Versed and 75 mcg Fentanyl for ROBYN (monitored sedation from 1128 to 1138)      ROBYN terminated due to failure to advance probe down orophyranx.

## 2023-07-28 NOTE — PROGRESS NOTES
Patient arrived to Non-Invasive Cardiology Lab for Out Patient ROBYN  Procedure. Staff introduced to patient. Patient identifiers verified with Name and Date of Birth. Procedure verified with patient. Consent forms reviewed and signed by patient or authorized representative and verified. Allergies verified. Patient informed of procedure and plan of care. Questions answered with review. Patient on cardiac monitor, non-invasive blood pressure, SPO2 monitor. On RA. Patient is A&Ox3. Patient reports no complaints. Patient on stretcher, in low position, with side rails up. Patient instructed to call for assistance as needed. Family in waiting room.

## 2023-08-30 ENCOUNTER — ANESTHESIA EVENT (OUTPATIENT)
Facility: HOSPITAL | Age: 68
End: 2023-08-30
Payer: MEDICARE

## 2023-08-31 ENCOUNTER — ANESTHESIA (OUTPATIENT)
Facility: HOSPITAL | Age: 68
End: 2023-08-31
Payer: MEDICARE

## 2023-08-31 ENCOUNTER — HOSPITAL ENCOUNTER (OUTPATIENT)
Facility: HOSPITAL | Age: 68
Setting detail: OUTPATIENT SURGERY
Discharge: HOME | End: 2023-08-31
Attending: INTERNAL MEDICINE | Admitting: INTERNAL MEDICINE
Payer: MEDICARE

## 2023-08-31 VITALS
TEMPERATURE: 98 F | RESPIRATION RATE: 20 BRPM | HEIGHT: 71 IN | WEIGHT: 147 LBS | SYSTOLIC BLOOD PRESSURE: 139 MMHG | DIASTOLIC BLOOD PRESSURE: 66 MMHG | BODY MASS INDEX: 20.58 KG/M2 | HEART RATE: 89 BPM | OXYGEN SATURATION: 99 %

## 2023-08-31 PROCEDURE — 3600007502: Performed by: INTERNAL MEDICINE

## 2023-08-31 PROCEDURE — 2720000010 HC SURG SUPPLY STERILE: Performed by: INTERNAL MEDICINE

## 2023-08-31 PROCEDURE — 6370000000 HC RX 637 (ALT 250 FOR IP): Performed by: NURSE ANESTHETIST, CERTIFIED REGISTERED

## 2023-08-31 PROCEDURE — 2580000003 HC RX 258: Performed by: INTERNAL MEDICINE

## 2023-08-31 PROCEDURE — 7100000011 HC PHASE II RECOVERY - ADDTL 15 MIN: Performed by: INTERNAL MEDICINE

## 2023-08-31 PROCEDURE — 7100000010 HC PHASE II RECOVERY - FIRST 15 MIN: Performed by: INTERNAL MEDICINE

## 2023-08-31 PROCEDURE — 88305 TISSUE EXAM BY PATHOLOGIST: CPT

## 2023-08-31 PROCEDURE — 2709999900 HC NON-CHARGEABLE SUPPLY: Performed by: INTERNAL MEDICINE

## 2023-08-31 PROCEDURE — 3700000001 HC ADD 15 MINUTES (ANESTHESIA): Performed by: INTERNAL MEDICINE

## 2023-08-31 PROCEDURE — 3600007512: Performed by: INTERNAL MEDICINE

## 2023-08-31 PROCEDURE — 2500000003 HC RX 250 WO HCPCS: Performed by: NURSE ANESTHETIST, CERTIFIED REGISTERED

## 2023-08-31 PROCEDURE — 6360000002 HC RX W HCPCS: Performed by: NURSE ANESTHETIST, CERTIFIED REGISTERED

## 2023-08-31 PROCEDURE — 3700000000 HC ANESTHESIA ATTENDED CARE: Performed by: INTERNAL MEDICINE

## 2023-08-31 RX ORDER — SODIUM CHLORIDE 0.9 % (FLUSH) 0.9 %
5-40 SYRINGE (ML) INJECTION PRN
Status: DISCONTINUED | OUTPATIENT
Start: 2023-08-31 | End: 2023-08-31 | Stop reason: HOSPADM

## 2023-08-31 RX ORDER — SODIUM CHLORIDE 9 MG/ML
25 INJECTION, SOLUTION INTRAVENOUS PRN
Status: DISCONTINUED | OUTPATIENT
Start: 2023-08-31 | End: 2023-08-31 | Stop reason: HOSPADM

## 2023-08-31 RX ORDER — CARVEDILOL 3.12 MG/1
3.12 TABLET ORAL 2 TIMES DAILY
COMMUNITY
Start: 2023-08-07

## 2023-08-31 RX ORDER — SODIUM CHLORIDE 0.9 % (FLUSH) 0.9 %
5-40 SYRINGE (ML) INJECTION EVERY 12 HOURS SCHEDULED
Status: DISCONTINUED | OUTPATIENT
Start: 2023-08-31 | End: 2023-08-31 | Stop reason: HOSPADM

## 2023-08-31 RX ADMIN — BENZOCAINE 1 EACH: 200 SPRAY DENTAL; ORAL; PERIODONTAL at 12:41

## 2023-08-31 RX ADMIN — PROPOFOL 50 MG: 10 INJECTION, EMULSION INTRAVENOUS at 12:46

## 2023-08-31 RX ADMIN — PROPOFOL 20 MG: 10 INJECTION, EMULSION INTRAVENOUS at 12:57

## 2023-08-31 RX ADMIN — SODIUM CHLORIDE 25 ML: 9 INJECTION, SOLUTION INTRAVENOUS at 12:37

## 2023-08-31 RX ADMIN — LIDOCAINE HYDROCHLORIDE 100 MG: 20 INJECTION, SOLUTION EPIDURAL; INFILTRATION; INTRACAUDAL; PERINEURAL at 12:42

## 2023-08-31 RX ADMIN — PROPOFOL 100 MG: 10 INJECTION, EMULSION INTRAVENOUS at 12:42

## 2023-08-31 RX ADMIN — PROPOFOL 50 MG: 10 INJECTION, EMULSION INTRAVENOUS at 12:53

## 2023-08-31 RX ADMIN — PROPOFOL 20 MG: 10 INJECTION, EMULSION INTRAVENOUS at 13:01

## 2023-08-31 ASSESSMENT — PAIN - FUNCTIONAL ASSESSMENT: PAIN_FUNCTIONAL_ASSESSMENT: 0-10

## 2023-08-31 NOTE — ANESTHESIA POSTPROCEDURE EVALUATION
Department of Anesthesiology  Postprocedure Note    Patient: Lucretia Love  MRN: [id number]  YOB: 1955  Date of evaluation: 8/31/2023      Procedure Summary     Date: 08/31/23 Room / Location: [facility] ENDO 01 / [facility] ENDOSCOPY    Anesthesia Start: 1237 Anesthesia Stop: 1314    Procedures:       EGD, COLONOSCOPY (Upper GI Region)      COLONOSCOPY DIAGNOSTIC (Lower GI Region) Diagnosis:       Esophageal dysphagia      Arzate's esophagus without dysplasia      Loss of appetite      (Esophageal dysphagia [R13.19])      (Arzate's esophagus without dysplasia [K22.70])      (Loss of appetite [R63.0])    Surgeons: Rolf Parsons MD Responsible Provider: Shanelle Leiva MD    Anesthesia Type: TIVA ASA Status: 2          Anesthesia Type: TIVA    Quoc Phase I: Quoc Score: 10    Quoc Phase II:        Anesthesia Post Evaluation    Patient location during evaluation: PACU  Patient participation: complete - patient participated  Level of consciousness: sleepy but conscious and responsive to verbal stimuli  Airway patency: patent  Nausea & Vomiting: no vomiting and no nausea  Complications: no  Cardiovascular status: blood pressure returned to baseline and hemodynamically stable  Respiratory status: acceptable  Hydration status: stable

## 2023-08-31 NOTE — PROGRESS NOTES
Endoscopy Case End Note:    2147:  Procedure scope (EGD) was pre-cleaned, per protocol, at bedside by Satya Grimm, [address]:  Procedure scope (colon) was pre-cleaned, per protocol, at bedside by Satya Grimm, [address]:  Report received from anesthesia - Jimbo Tapia CRNA. See anesthesia flowsheet for intra-procedure vital signs and events.

## 2023-08-31 NOTE — DISCHARGE INSTRUCTIONS
[place] Office: [phone]    Lillie Borges  [id number]  1955    EGD/COLONOSCOPY DISCHARGE INSTRUCTIONS  Discomfort:  Sore throat- throat lozenges or warm salt water gargle  redness at IV site- apply warm compress to area; if redness or soreness persist- contact your physician  Gaseous discomfort- walking, belching will help relieve any discomfort  You may not operate a vehicle for 12 hours  You may not engage in an occupation involving machinery or appliances for rest of today. You may not drink alcoholic beverages for at least 12 hours  Avoid making any critical decisions for at least 24 hour  DIET  You may resume your regular diet - however -  remember your colon is empty and a heavy meal will produce gas. Avoid these foods:  fried / greasy foods, excessive carbonated drinks or too much caffeine  MEDICATIONS   Regarding Aspirin or Nonsteroidal medications specifically, please see below. ACTIVITY  You may resume your normal daily activities. Spend the remainder of the day resting -  avoid any strenuous activity. CALL M.D. ANY SIGN OF   Increasing pain, nausea, vomiting  Abdominal distension (swelling)  New increased bleeding (oral or rectal)  Fever (chills)  Pain in chest area  Bloody discharge from nose or mouth  Shortness of breath    You may not take any Advil, Aspirin, Ibuprofen, Motrin or Aleve(NSAIDs) for 7 days, ONLY  Tylenol as needed for pain. Findings:  The Olympus video high-definition colonoscope was advanced to the cecum, identified by its typical land marks, with ease. The mucosa of the colon is normal appearing to the cecum with no obvious mucosal pathology (polyp,mass lesion, colitis etc) noted on this colonoscopy. Pan-colonic moderately severe diverticulosis, some with impacted stool, is noted. It spares the cecum. Medium-sized internal hemorrhoids. EGD:    Esophagus: The esophageal mucosa is normal appearing  Biopsies were taken to r/o EoE.   A difficult to see Schatzki's ring is noted at the GE junction. TTS CRE 18-20 mm balloon dilation performed x 1 minute each station. The ring did not break. Multiple bite-on-bite biopsies were then taken to break the rings,successfully. A small hiatal hernia is noted  The squamo-columnar junction is at 44 cm where the Z-line was noted. Stomach: The gastric mucosa has erythema in the body and antrum. Biopsies taken  The fundus was found to be normal with no lesions noted on retroflexion. The angularis is normal as well. Duodenum:   The bulb and post bulbar mucosa is normal in appearance. The duodenal folds are normal. Biopsied. Follow-up Instructions:   Call  Kale Rodriguez MD for any questions or concerns  Results of procedure / biopsy in 7 days   Telephone # [phone]      [unfilled]   Patient Education on Sedation / Analgesia Administered for Procedure      For 24 hours after general anesthesia or intravenous analgesia / sedation:  Have someone responsible help you with your care  Limit your activities  Do not drive and operate hazardous machinery  Do not make important personal, legal or business decisions  Do not drink alcoholic beverages  If you have not urinated within 8 hours after discharge, please contact your physician  Resume your medications unless otherwise instructed    For 24 hours after general anesthesia or intravenous analgesia / sedation  you may experience:  Drowsiness, dizziness, sleepiness, or confusion  Difficulty remembering or delayed reaction times  Difficulty with your balance, especially while walking, move slowly and carefully, do not make sudden position changes  Difficulty focusing or blurred vision    You may not be aware of slight changes in your behavior and/or your reaction time because of the medication used during and after your procedure.     Report the following to your physician:  Excessive pain, swelling, redness or odor of or around the surgical

## 2023-08-31 NOTE — H&P
Pre-endoscopy H and P    The patient was seen and examined in the room/pre-op holding area. The airway was assessed and documented. The problem list, past medical history, and medications were reviewed. Patient Active Problem List   Diagnosis    Lt groin pain    Vertigo     Social History     Socioeconomic History    Marital status: Single     Spouse name: Not on file    Number of children: Not on file    Years of education: Not on file    Highest education level: Not on file   Occupational History    Not on file   Tobacco Use    Smoking status: Never    Smokeless tobacco: Never   Substance and Sexual Activity    Alcohol use: Yes     Alcohol/week: 3.0 standard drinks     Types: 3 Shots of liquor per week    Drug use: No    Sexual activity: Not on file   Other Topics Concern    Not on file   Social History Narrative    Not on file     Social Determinants of Health     Financial Resource Strain: Not on file   Food Insecurity: Not on file   Transportation Needs: Not on file   Physical Activity: Not on file   Stress: Not on file   Social Connections: Not on file   Intimate Partner Violence: Not on file   Housing Stability: Not on file     Past Medical History:   Diagnosis Date    Dilation of esophagus     GERD (gastroesophageal reflux disease)     Hearing loss     Left ear complete hearing loss    Hyperlipemia     Ill-defined condition     vertigo    Weight loss     as of 6/10/16 pt states ongoing x1 yr and still being evaluated         Prior to Admission Medications   Prescriptions Last Dose Informant Patient Reported?  Taking?   acyclovir (ZOVIRAX) 400 MG tablet Unknown  Yes No   Sig: Take 1 tablet by mouth daily   aspirin 81 MG chewable tablet Not Taking  Yes No   Sig: Take by mouth daily   Patient not taking: Reported on 7/28/2023   atorvastatin (LIPITOR) 20 MG tablet 8/30/2023  Yes No   Sig: Take 1 tablet by mouth daily   carvedilol (COREG) 3.125 MG tablet   Yes No   Sig: Take 1 tablet by mouth 2 times daily furosemide (LASIX) 20 MG tablet 8/30/2023  No No   Sig: Take 1 tablet by mouth 2 times daily for 7 days   meloxicam (MOBIC) 15 MG tablet 8/30/2023  Yes No   Sig: Take 1 tablet by mouth daily   omeprazole (PRILOSEC) 40 MG delayed release capsule 8/30/2023  Yes No   Sig: Take 1 capsule by mouth daily   sacubitril-valsartan (ENTRESTO) 24-26 MG per tablet 8/30/2023  Yes No   Sig: Take 1 tablet by mouth 2 times daily      Facility-Administered Medications: None           The review of systems is:  Negative  for shortness of breath or chest pain      The heart, lungs, and mental status were satisfactory for the administration of deep sedation and for the procedure. I discussed with the patient the objectives, risks, consequences and alternatives to the procedure.       Abdifatah Richey MD  8/31/2023  12:30 PM

## 2023-08-31 NOTE — OP NOTE
internal hemorrhoids. Therapies:  none    Specimen/s:  none     Complications: None were encountered during the procedure. EBL:  None. Recommendations:     -For colon cancer screening in this average-risk patient, colonoscopy may be repeated in 10 years.  -High fiber diet. Naturally, for new bleeding, unexplained weight loss,change in bowel habits and anemia, an earlier colonoscopy should be considered. Ava Sandra MD  8/31/2023  1:05 PM

## 2023-08-31 NOTE — OP NOTE
[place] Office: [phone]      Esophagogastroduodenoscopy Procedure Note      Lei Cooper  1955  [id number]    Indication:  Dysphagia ,hx of focal graves's in the past     : Keshia Palacios MD    Referring Provider:  Lorrie Boykin MD    Sedation:  MAC anesthesia Propofol    Procedure Details:  After detailed informed consent was obtained for the procedure, with all risks and benefits of procedure explained the patient was taken to the endoscopy suite and placed in the left lateral decubitus position. Following sequential administration of sedation as per above, the endoscope was inserted into the mouth and advanced under direct vision to second portion of the duodenum. A careful inspection was made as the gastroscope was withdrawn, including a retroflexed view of the proximal stomach; findings and interventions are described below. Findings:     Esophagus: The esophageal mucosa is normal appearing  Biopsies were taken to r/o EoE. A difficult to see Schatzki's ring is noted at the GE junction. TTS CRE 18-20 mm balloon dilation performed x 1 minute each station. The ring did not break. Multiple bite-on-bite biopsies were then taken to break the rings,successfully. A small hiatal hernia is noted  The squamo-columnar junction is at 44 cm where the Z-line was noted. Stomach: The gastric mucosa has erythema in the body and antrum. Biopsies taken  The fundus was found to be normal with no lesions noted on retroflexion. The angularis is normal as well. Duodenum:   The bulb and post bulbar mucosa is normal in appearance. The duodenal folds are normal. Biopsied. Therapies:    esophageal dilation with 18- 20 mm sized balloon  biopsy of esophagus as above  biopsy of stomach body, antrum  biopsy of duodenal distal bulb, second portion    Specimen:  Specimens were collected as described and send to the laboratory.            Complications:   None were encountered during the procedure. EBL:  None. Recommendations:     -Continue acid suppression. ,   -Await pathology. ,   -Follow symptoms. ,   -Follow up with primary care physician      Thank you for entrusting me with this patient's care. Please do not hesitate to contact me with any questions or if I can be of assistance with any of your other patients' GI needs. Natasha Byrd MD  8/31/2023  12:53 PM

## 2024-03-27 ENCOUNTER — APPOINTMENT (OUTPATIENT)
Facility: HOSPITAL | Age: 69
End: 2024-03-27
Payer: MEDICARE

## 2024-03-27 ENCOUNTER — HOSPITAL ENCOUNTER (EMERGENCY)
Facility: HOSPITAL | Age: 69
Discharge: HOME | End: 2024-03-27
Payer: MEDICARE

## 2024-03-27 VITALS
TEMPERATURE: 98.1 F | HEART RATE: 86 BPM | BODY MASS INDEX: 21.59 KG/M2 | SYSTOLIC BLOOD PRESSURE: 132 MMHG | OXYGEN SATURATION: 97 % | WEIGHT: 154.76 LBS | DIASTOLIC BLOOD PRESSURE: 66 MMHG | RESPIRATION RATE: 18 BRPM

## 2024-03-27 DIAGNOSIS — S62.524A CLOSED NONDISPLACED FRACTURE OF DISTAL PHALANX OF RIGHT THUMB, INITIAL ENCOUNTER: Primary | ICD-10-CM

## 2024-03-27 DIAGNOSIS — S61.011A LACERATION OF RIGHT THUMB, FOREIGN BODY PRESENCE UNSPECIFIED, NAIL DAMAGE STATUS UNSPECIFIED, INITIAL ENCOUNTER: ICD-10-CM

## 2024-03-27 PROCEDURE — 2580000003 HC RX 258: Performed by: PHYSICIAN ASSISTANT

## 2024-03-27 PROCEDURE — 99284 EMERGENCY DEPT VISIT MOD MDM: CPT

## 2024-03-27 PROCEDURE — 90715 TDAP VACCINE 7 YRS/> IM: CPT | Performed by: PHYSICIAN ASSISTANT

## 2024-03-27 PROCEDURE — 73140 X-RAY EXAM OF FINGER(S): CPT

## 2024-03-27 PROCEDURE — 2500000003 HC RX 250 WO HCPCS: Performed by: PHYSICIAN ASSISTANT

## 2024-03-27 PROCEDURE — 12002 RPR S/N/AX/GEN/TRNK2.6-7.5CM: CPT

## 2024-03-27 PROCEDURE — 90471 IMMUNIZATION ADMIN: CPT | Performed by: PHYSICIAN ASSISTANT

## 2024-03-27 PROCEDURE — 6360000002 HC RX W HCPCS: Performed by: PHYSICIAN ASSISTANT

## 2024-03-27 PROCEDURE — 96365 THER/PROPH/DIAG IV INF INIT: CPT

## 2024-03-27 PROCEDURE — 6370000000 HC RX 637 (ALT 250 FOR IP): Performed by: PHYSICIAN ASSISTANT

## 2024-03-27 RX ORDER — GINSENG 100 MG
CAPSULE ORAL ONCE
Status: COMPLETED | OUTPATIENT
Start: 2024-03-27 | End: 2024-03-27

## 2024-03-27 RX ORDER — HYDROCODONE BITARTRATE AND ACETAMINOPHEN 5; 325 MG/1; MG/1
1 TABLET ORAL
Status: COMPLETED | OUTPATIENT
Start: 2024-03-27 | End: 2024-03-27

## 2024-03-27 RX ORDER — CEPHALEXIN 500 MG/1
500 CAPSULE ORAL 3 TIMES DAILY
Qty: 21 CAPSULE | Refills: 0 | Status: SHIPPED | OUTPATIENT
Start: 2024-03-27 | End: 2024-04-03

## 2024-03-27 RX ORDER — LIDOCAINE HYDROCHLORIDE 10 MG/ML
2 INJECTION, SOLUTION EPIDURAL; INFILTRATION; INTRACAUDAL; PERINEURAL ONCE
Status: COMPLETED | OUTPATIENT
Start: 2024-03-27 | End: 2024-03-27

## 2024-03-27 RX ADMIN — LIDOCAINE HYDROCHLORIDE 2 ML: 10 INJECTION, SOLUTION EPIDURAL; INFILTRATION; INTRACAUDAL; PERINEURAL at 09:59

## 2024-03-27 RX ADMIN — HYDROCODONE BITARTRATE AND ACETAMINOPHEN 1 TABLET: 5; 325 TABLET ORAL at 09:54

## 2024-03-27 RX ADMIN — BACITRACIN 1 EACH: 500 OINTMENT TOPICAL at 12:00

## 2024-03-27 RX ADMIN — SODIUM CHLORIDE 1000 MG: 900 INJECTION INTRAVENOUS at 10:34

## 2024-03-27 RX ADMIN — TETANUS TOXOID, REDUCED DIPHTHERIA TOXOID AND ACELLULAR PERTUSSIS VACCINE, ADSORBED 0.5 ML: 5; 2.5; 8; 8; 2.5 SUSPENSION INTRAMUSCULAR at 09:55

## 2024-03-27 ASSESSMENT — PAIN DESCRIPTION - LOCATION: LOCATION: ARM

## 2024-03-27 ASSESSMENT — PAIN - FUNCTIONAL ASSESSMENT: PAIN_FUNCTIONAL_ASSESSMENT: NONE - DENIES PAIN

## 2024-03-27 ASSESSMENT — PAIN SCALES - GENERAL: PAINLEVEL_OUTOF10: 8

## 2024-03-27 ASSESSMENT — PAIN DESCRIPTION - ORIENTATION: ORIENTATION: RIGHT

## 2024-03-27 NOTE — DISCHARGE INSTRUCTIONS
Suture removal in next 12-14 days   Antibiotics as prescribed   Return precautions as we discussed       Thank You!    It was a pleasure taking care of you in our Emergency Department today. We know that when you come to [facility], you are entrusting us with your health, comfort, and safety. Our clinicians honor that trust, and truly appreciate the opportunity to care for you and your loved ones.    If you receive a survey about your Emergency Department experience today, please fill it out.  We value your feedback. Thank you.      Makenna Pearce PA-C    ___________________________________  I have included a copy of your lab results and/or radiologic studies from today's visit so you can have them easily available at your follow-up visit.   No results found for this or any previous visit (from the past 12 hour(s)).    XR FINGER RIGHT (MIN 2 VIEWS)   Final Result   Acute distal phalanx fracture..           [unfilled]

## 2024-03-27 NOTE — ED PROVIDER NOTES
[facility] EMERGENCY DEPT  EMERGENCY DEPARTMENT ENCOUNTER       Pt Name: Emmanuel Shrestha  MRN: [id number]  Birthdate 1955  Date of evaluation: 3/27/2024  Provider: JOSE Sanchez   PCP: Catarino Gomez MD  Note Started: 9:47 AM EDT 3/27/24     CHIEF COMPLAINT       Chief Complaint   Patient presents with    Finger Injury     Pt presents ambulatory to triage from work after injuring the thumb on his R hand. Pt reports he was bending pipe and a metal sheet when his thumb became twisted and cut. Pt reports no pain at this time but believes his adrenaline is still, \"kicked in\". HX of L sided cochlear implant         HISTORY OF PRESENT ILLNESS: 1 or more elements      History From: Patient  None     Emmanuel Shrestha is a 68 y.o. male who presents to the ED for evaluation of right thumb pain and laceration that occurred just prior to arrival after he injured it being cut on a piece of metal. Endorses localized pain. Bleeding controlled with pressure and towel. Tetanus greater than 10 years. Denies numbness, weakness or tingling. States he is otherwise in his usual state of health.      Nursing Notes were all reviewed and agreed with or any disagreements were addressed in the HPI.     REVIEW OF SYSTEMS      Review of Systems   All other systems reviewed and are negative.       Positives and Pertinent negatives as per HPI.    PAST HISTORY     Past Medical History:  Past Medical History:   Diagnosis Date    Dilation of esophagus     GERD (gastroesophageal reflux disease)     Hearing loss     Left ear complete hearing loss    Hyperlipemia     Ill-defined condition     vertigo    Weight loss     as of 6/10/16 pt states ongoing x1 yr and still being evaluated       Past Surgical History:  Past Surgical History:   Procedure Laterality Date    CATARACT REMOVAL Bilateral 06/14/2016    COLONOSCOPY N/A 8/31/2023    COLONOSCOPY DIAGNOSTIC performed by Vini Monsalve MD at [facility] ENDOSCOPY    HERNIA REPAIR Bilateral 2010

## 2024-04-02 RX ORDER — EMPAGLIFLOZIN 10 MG/1
10 TABLET, FILM COATED ORAL DAILY
COMMUNITY

## 2024-04-02 NOTE — PERIOP NOTE
[facility]  Ambulatory Surgery Unit  Pre-operative Instructions    Surgery/Procedure Date  Thursday 4/4            Tentative Arrival Time TBD      1. On the day of your surgery/procedure, please report to the Ambulatory Surgery Unit Registration Desk and sign in at your designated time. The Ambulatory Surgery Unit is located in [facility] on the [facility] across from the [facility]. Please have all of your health insurance cards, co-payment, and a photo ID.    **TWO adults may accompany you the day of the procedure.  We have limited seating available.      2. You cannot be dropped off for surgery.  Please make arrangements for a responsible adult friend or family member to remain on the hospital campus during your procedure, and drive you home, as you should not drive for 24 hours following anesthesia. Make arrangements for a responsible adult to stay with you for at least the first 24 hours after your surgery.    3. Do not have anything to eat or drink (including water, gum, mints, coffee, juice) after 11:59 PM on Wednesday 4/3. This may not apply to medications prescribed by your physician.  (Please note below the special instructions with medications to take the morning of surgery, if applicable.)    4. We recommend you do not drink any alcoholic beverages for 24 hours before and after your surgery.    5. Contact your surgeon’s office for instructions on the following medications: non-steroidal anti-inflammatory drugs (i.e. Advil, Aleve), vitamins, and supplements. (Some surgeon’s will want you to stop these medications prior to surgery and others may allow you to take them)   **If you are currently taking Plavix, Coumadin, Aspirin and/or other blood-thinning agents, contact your surgeon for instructions.** Your surgeon will partner with the physician prescribing these medications to determine if it is safe to stop or if you need to continue taking. Please

## 2024-04-03 ENCOUNTER — ANESTHESIA EVENT (OUTPATIENT)
Facility: HOSPITAL | Age: 69
End: 2024-04-03
Payer: MEDICARE

## 2024-04-03 NOTE — PERIOP NOTE
Requested heart notes and testing via email from [facility] to be faxed to [facility] on 4/2/2024    [phone] - LVM at Dr Vasques office requesting orders to be faxed to [facility]

## 2024-04-04 ENCOUNTER — ANESTHESIA (OUTPATIENT)
Facility: HOSPITAL | Age: 69
End: 2024-04-04
Payer: MEDICARE

## 2024-04-04 ENCOUNTER — HOSPITAL ENCOUNTER (OUTPATIENT)
Facility: HOSPITAL | Age: 69
Setting detail: OUTPATIENT SURGERY
Discharge: HOME | End: 2024-04-04
Attending: ORTHOPAEDIC SURGERY | Admitting: ORTHOPAEDIC SURGERY
Payer: MEDICARE

## 2024-04-04 VITALS
OXYGEN SATURATION: 98 % | BODY MASS INDEX: 21 KG/M2 | SYSTOLIC BLOOD PRESSURE: 133 MMHG | WEIGHT: 150 LBS | DIASTOLIC BLOOD PRESSURE: 65 MMHG | TEMPERATURE: 98.2 F | HEIGHT: 71 IN | HEART RATE: 82 BPM | RESPIRATION RATE: 16 BRPM

## 2024-04-04 DIAGNOSIS — M79.609 POSTOPERATIVE PAIN OF EXTREMITY: Primary | ICD-10-CM

## 2024-04-04 DIAGNOSIS — G89.18 POSTOPERATIVE PAIN OF EXTREMITY: Primary | ICD-10-CM

## 2024-04-04 LAB
GLUCOSE BLD STRIP.AUTO-MCNC: 84 MG/DL (ref 65–117)
GLUCOSE BLD STRIP.AUTO-MCNC: 85 MG/DL (ref 65–117)
SERVICE CMNT-IMP: NORMAL
SERVICE CMNT-IMP: NORMAL

## 2024-04-04 PROCEDURE — 7100000011 HC PHASE II RECOVERY - ADDTL 15 MIN: Performed by: ORTHOPAEDIC SURGERY

## 2024-04-04 PROCEDURE — 82962 GLUCOSE BLOOD TEST: CPT

## 2024-04-04 PROCEDURE — 2500000003 HC RX 250 WO HCPCS

## 2024-04-04 PROCEDURE — 6360000002 HC RX W HCPCS: Performed by: ORTHOPAEDIC SURGERY

## 2024-04-04 PROCEDURE — 3700000000 HC ANESTHESIA ATTENDED CARE: Performed by: ORTHOPAEDIC SURGERY

## 2024-04-04 PROCEDURE — 3600000013 HC SURGERY LEVEL 3 ADDTL 15MIN: Performed by: ORTHOPAEDIC SURGERY

## 2024-04-04 PROCEDURE — 6360000002 HC RX W HCPCS

## 2024-04-04 PROCEDURE — 3700000001 HC ADD 15 MINUTES (ANESTHESIA): Performed by: ORTHOPAEDIC SURGERY

## 2024-04-04 PROCEDURE — 2500000003 HC RX 250 WO HCPCS: Performed by: ORTHOPAEDIC SURGERY

## 2024-04-04 PROCEDURE — 7100000001 HC PACU RECOVERY - ADDTL 15 MIN: Performed by: ORTHOPAEDIC SURGERY

## 2024-04-04 PROCEDURE — 3600000003 HC SURGERY LEVEL 3 BASE: Performed by: ORTHOPAEDIC SURGERY

## 2024-04-04 PROCEDURE — 2580000003 HC RX 258: Performed by: ANESTHESIOLOGY

## 2024-04-04 PROCEDURE — 7100000000 HC PACU RECOVERY - FIRST 15 MIN: Performed by: ORTHOPAEDIC SURGERY

## 2024-04-04 PROCEDURE — 7100000010 HC PHASE II RECOVERY - FIRST 15 MIN: Performed by: ORTHOPAEDIC SURGERY

## 2024-04-04 PROCEDURE — 2709999900 HC NON-CHARGEABLE SUPPLY: Performed by: ORTHOPAEDIC SURGERY

## 2024-04-04 PROCEDURE — 2580000003 HC RX 258: Performed by: ORTHOPAEDIC SURGERY

## 2024-04-04 RX ORDER — DROPERIDOL 2.5 MG/ML
0.62 INJECTION, SOLUTION INTRAMUSCULAR; INTRAVENOUS
Status: DISCONTINUED | OUTPATIENT
Start: 2024-04-04 | End: 2024-04-04 | Stop reason: HOSPADM

## 2024-04-04 RX ORDER — NALOXONE HYDROCHLORIDE 0.4 MG/ML
INJECTION, SOLUTION INTRAMUSCULAR; INTRAVENOUS; SUBCUTANEOUS PRN
Status: DISCONTINUED | OUTPATIENT
Start: 2024-04-04 | End: 2024-04-04 | Stop reason: HOSPADM

## 2024-04-04 RX ORDER — ONDANSETRON 2 MG/ML
4 INJECTION INTRAMUSCULAR; INTRAVENOUS
Status: DISCONTINUED | OUTPATIENT
Start: 2024-04-04 | End: 2024-04-04 | Stop reason: HOSPADM

## 2024-04-04 RX ORDER — SODIUM CHLORIDE 0.9 % (FLUSH) 0.9 %
5-40 SYRINGE (ML) INJECTION PRN
Status: DISCONTINUED | OUTPATIENT
Start: 2024-04-04 | End: 2024-04-04 | Stop reason: HOSPADM

## 2024-04-04 RX ORDER — FENTANYL CITRATE 50 UG/ML
INJECTION, SOLUTION INTRAMUSCULAR; INTRAVENOUS PRN
Status: DISCONTINUED | OUTPATIENT
Start: 2024-04-04 | End: 2024-04-04 | Stop reason: SDUPTHER

## 2024-04-04 RX ORDER — SODIUM CHLORIDE 9 MG/ML
INJECTION, SOLUTION INTRAVENOUS PRN
Status: DISCONTINUED | OUTPATIENT
Start: 2024-04-04 | End: 2024-04-04 | Stop reason: HOSPADM

## 2024-04-04 RX ORDER — MIDAZOLAM HYDROCHLORIDE 1 MG/ML
2 INJECTION, SOLUTION INTRAMUSCULAR; INTRAVENOUS
Status: DISCONTINUED | OUTPATIENT
Start: 2024-04-04 | End: 2024-04-04 | Stop reason: HOSPADM

## 2024-04-04 RX ORDER — ACETAMINOPHEN 500 MG
1000 TABLET ORAL
Status: DISCONTINUED | OUTPATIENT
Start: 2024-04-04 | End: 2024-04-04 | Stop reason: HOSPADM

## 2024-04-04 RX ORDER — SODIUM CHLORIDE 0.9 % (FLUSH) 0.9 %
5-40 SYRINGE (ML) INJECTION EVERY 12 HOURS SCHEDULED
Status: DISCONTINUED | OUTPATIENT
Start: 2024-04-04 | End: 2024-04-04 | Stop reason: HOSPADM

## 2024-04-04 RX ORDER — OXYCODONE HYDROCHLORIDE 5 MG/1
10 TABLET ORAL PRN
Status: DISCONTINUED | OUTPATIENT
Start: 2024-04-04 | End: 2024-04-04 | Stop reason: HOSPADM

## 2024-04-04 RX ORDER — HYDROCODONE BITARTRATE AND ACETAMINOPHEN 5; 325 MG/1; MG/1
1 TABLET ORAL EVERY 6 HOURS PRN
Qty: 15 TABLET | Refills: 0 | Status: SHIPPED | OUTPATIENT
Start: 2024-04-04 | End: 2024-04-09

## 2024-04-04 RX ORDER — WATER 10 ML/10ML
INJECTION INTRAMUSCULAR; INTRAVENOUS; SUBCUTANEOUS
Status: DISCONTINUED
Start: 2024-04-04 | End: 2024-04-04 | Stop reason: HOSPADM

## 2024-04-04 RX ORDER — LIDOCAINE HYDROCHLORIDE 10 MG/ML
1 INJECTION, SOLUTION EPIDURAL; INFILTRATION; INTRACAUDAL; PERINEURAL
Status: DISCONTINUED | OUTPATIENT
Start: 2024-04-04 | End: 2024-04-04 | Stop reason: HOSPADM

## 2024-04-04 RX ORDER — LIDOCAINE HYDROCHLORIDE 20 MG/ML
INJECTION, SOLUTION EPIDURAL; INFILTRATION; INTRACAUDAL; PERINEURAL PRN
Status: DISCONTINUED | OUTPATIENT
Start: 2024-04-04 | End: 2024-04-04 | Stop reason: SDUPTHER

## 2024-04-04 RX ORDER — PHENYLEPHRINE HCL IN 0.9% NACL 0.4MG/10ML
SYRINGE (ML) INTRAVENOUS PRN
Status: DISCONTINUED | OUTPATIENT
Start: 2024-04-04 | End: 2024-04-04 | Stop reason: SDUPTHER

## 2024-04-04 RX ORDER — CEFAZOLIN SODIUM 1 G/3ML
INJECTION, POWDER, FOR SOLUTION INTRAMUSCULAR; INTRAVENOUS
Status: DISCONTINUED
Start: 2024-04-04 | End: 2024-04-04 | Stop reason: HOSPADM

## 2024-04-04 RX ORDER — EPHEDRINE SULFATE/0.9% NACL/PF 50 MG/5 ML
SYRINGE (ML) INTRAVENOUS PRN
Status: DISCONTINUED | OUTPATIENT
Start: 2024-04-04 | End: 2024-04-04 | Stop reason: SDUPTHER

## 2024-04-04 RX ORDER — LIDOCAINE HYDROCHLORIDE AND EPINEPHRINE BITARTRATE 20; .01 MG/ML; MG/ML
INJECTION, SOLUTION SUBCUTANEOUS PRN
Status: DISCONTINUED | OUTPATIENT
Start: 2024-04-04 | End: 2024-04-04 | Stop reason: ALTCHOICE

## 2024-04-04 RX ORDER — MEPERIDINE HYDROCHLORIDE 25 MG/ML
12.5 INJECTION INTRAMUSCULAR; INTRAVENOUS; SUBCUTANEOUS EVERY 5 MIN PRN
Status: DISCONTINUED | OUTPATIENT
Start: 2024-04-04 | End: 2024-04-04 | Stop reason: HOSPADM

## 2024-04-04 RX ORDER — NOREPINEPHRINE BIT/0.9 % NACL 16MG/250ML
INFUSION BOTTLE (ML) INTRAVENOUS PRN
Status: DISCONTINUED | OUTPATIENT
Start: 2024-04-04 | End: 2024-04-04 | Stop reason: SDUPTHER

## 2024-04-04 RX ORDER — OXYCODONE HYDROCHLORIDE 5 MG/1
5 TABLET ORAL PRN
Status: DISCONTINUED | OUTPATIENT
Start: 2024-04-04 | End: 2024-04-04 | Stop reason: HOSPADM

## 2024-04-04 RX ORDER — SODIUM CHLORIDE, SODIUM LACTATE, POTASSIUM CHLORIDE, CALCIUM CHLORIDE 600; 310; 30; 20 MG/100ML; MG/100ML; MG/100ML; MG/100ML
INJECTION, SOLUTION INTRAVENOUS CONTINUOUS
Status: DISCONTINUED | OUTPATIENT
Start: 2024-04-04 | End: 2024-04-04 | Stop reason: HOSPADM

## 2024-04-04 RX ORDER — FENTANYL CITRATE 50 UG/ML
25 INJECTION, SOLUTION INTRAMUSCULAR; INTRAVENOUS EVERY 5 MIN PRN
Status: DISCONTINUED | OUTPATIENT
Start: 2024-04-04 | End: 2024-04-04 | Stop reason: HOSPADM

## 2024-04-04 RX ADMIN — Medication 40 MCG: at 08:46

## 2024-04-04 RX ADMIN — Medication 5 MG: at 08:58

## 2024-04-04 RX ADMIN — WATER 2000 MG: 1 INJECTION INTRAMUSCULAR; INTRAVENOUS; SUBCUTANEOUS at 08:38

## 2024-04-04 RX ADMIN — FENTANYL CITRATE 25 MCG: 50 INJECTION, SOLUTION INTRAMUSCULAR; INTRAVENOUS at 08:35

## 2024-04-04 RX ADMIN — PROPOFOL 20 MG: 10 INJECTION, EMULSION INTRAVENOUS at 08:37

## 2024-04-04 RX ADMIN — Medication 40 MCG: at 08:54

## 2024-04-04 RX ADMIN — SODIUM CHLORIDE, POTASSIUM CHLORIDE, SODIUM LACTATE AND CALCIUM CHLORIDE: 600; 310; 30; 20 INJECTION, SOLUTION INTRAVENOUS at 08:04

## 2024-04-04 RX ADMIN — Medication 4 MCG: at 09:06

## 2024-04-04 RX ADMIN — Medication 4 MCG: at 09:12

## 2024-04-04 RX ADMIN — PROPOFOL 40 MCG/KG/MIN: 10 INJECTION, EMULSION INTRAVENOUS at 08:35

## 2024-04-04 RX ADMIN — PROPOFOL 20 MG: 10 INJECTION, EMULSION INTRAVENOUS at 08:36

## 2024-04-04 RX ADMIN — Medication 40 MCG: at 08:52

## 2024-04-04 RX ADMIN — LIDOCAINE HYDROCHLORIDE 60 MG: 20 INJECTION, SOLUTION EPIDURAL; INFILTRATION; INTRACAUDAL; PERINEURAL at 08:35

## 2024-04-04 RX ADMIN — FENTANYL CITRATE 25 MCG: 50 INJECTION, SOLUTION INTRAMUSCULAR; INTRAVENOUS at 08:37

## 2024-04-04 RX ADMIN — PROPOFOL 20 MG: 10 INJECTION, EMULSION INTRAVENOUS at 08:38

## 2024-04-04 ASSESSMENT — PAIN DESCRIPTION - DESCRIPTORS: DESCRIPTORS: ACHING

## 2024-04-04 ASSESSMENT — PAIN - FUNCTIONAL ASSESSMENT: PAIN_FUNCTIONAL_ASSESSMENT: 0-10

## 2024-04-04 NOTE — DISCHARGE INSTRUCTIONS
Discharge Instructions for:    Emmaunel Shrestha / [id number] : 1955    Admitted 2024 Discharged: 2024       Postoperative Hand Surgery Instructions      Elevation:  Elevation is one of the most important things to do following your surgery.  Not only does it decrease swelling, but it also decreases pain.  For hand or wrist surgery, keep the arm in your sling while up and about, with your hand above your elbow.  When lying down, keep your arm propped up on a few pillows, so that your fingers are pointing towards the ceiling.    Finger Motion:  **It is very important that you begin moving your fingers (other than your thumb) immediately after surgery, as often as you can, in order to prevent stiffness.  Wiggling your fingers is not the same as moving them - moving your fingers involves bending them until they touch the dressing (if possible).  You should use your other hand to gently move the fingers on the operative hand if necessary.    **DO NOT attempt to move your right thumb.    Dressings:  Please leave the surgical dressing in place until you are seen in the office for your first post-operative appointment with Dr Vasques.  The dressing helps to prevent infection and positions the extremity to protect the surgical procedure  that was performed.  Bathing and showering are allowed as long as the dressing is kept dry.  To keep your dressing dry while bathing, simply place a plastic bag (bread bag, newspaper bag, trash bag or subway sandwich bag) over the dressing and seal it with tape or a rubber band.    Medications:  See Pain Management Protocol below     Note that my policy is to give only one prescription for pain medication at the time of the surgery - if you use it up quickly, then you will have no more pain medication left after only a few days, and I will not give you another prescription.  So use your pain medication sparingly, and only when necessary!    If you have new or increasing  Room Nurses on the day after your surgery to check on you. It is very important for us to know how you are recovering after your surgery. If you have an issue or need to speak with someone, please call your surgeon, do not wait for the post operative call.    You may receive an e-mail or letter in the mail from [facility] regarding your experience with us in the Ambulatory Surgery Unit. Your feedback is valuable to us and we appreciate your participation in the survey.       If the above instructions are not adequate or you are having problems after your surgery, call the physician at their office number.    We wish you a speedy recovery ?        What to do at Home:      *  Please give a list of your current medications to your Primary Care Provider.    *  Please update this list whenever your medications are discontinued, doses are      changed, or new medications (including over-the-counter products) are added.    *  Please carry medication information at all times in case of emergency situations.    If you have not received your influenza and/or pneumococcal vaccine, please follow up with your primary care physician.    The discharge information has been reviewed with the patient and caregiver.  The patient and caregiver verbalized understanding.

## 2024-04-04 NOTE — BRIEF OP NOTE
Brief Postoperative Note      Patient: Emmanuel Shrestha  YOB: 1955  MRN: [id number]    Date of Procedure: 4/4/2024    Pre-Op Diagnosis Codes:     * Open displaced fracture of distal phalanx of right thumb, initial encounter [S62.521B]    Post-Op Diagnosis: Same       Procedure(s):  IRRIGATION, DEBRIDEMENT, AND PINNING OF RIGHT THUMB OPEN DISTAL PHALANX FRACTURE    Surgeon(s):  Enio Vasques MD    Assistant:  * No surgical staff found *    Anesthesia: Monitor Anesthesia Care    Estimated Blood Loss (mL): Minimal    Complications: None    Specimens:   * No specimens in log *    Implants:  * No implants in log *      Drains: * No LDAs found *    Findings:    Other Findings: As above    Electronically signed by Enio Vasques MD on 4/4/2024 at 9:30 AM

## 2024-04-04 NOTE — PROGRESS NOTES
Permission received to review discharge instructions and discuss private health information with daughterJigna.    Patient states family/friend will be with them for 24 hours following procedure.     Mistral-Air warming blanket applied at this time. Set to appropriate setting that is comfortable to patient. Will continue to monitor.

## 2024-04-04 NOTE — PERIOP NOTE
Pt. States ready for discharge.  Vss.  Denies pain.  Dressing to right hand intact.  Discharge instructions reviewed w/pt and daughter.  They verbalized understanding.  Sling placed to right arm.  Pt discharged via wheelchair to car, accompanied by RN.  Pt discharged awake and alert, respirations equal and unlabored, skin warm, dry, and intact.  Pt and family members' questions and concerns addressed prior to discharge.

## 2024-04-04 NOTE — ANESTHESIA PRE PROCEDURE
Department of Anesthesiology  Preprocedure Note       Name:  Emmanuel Shrestha   Age:  68 y.o.  :  1955                                          MRN:  [id number]         Date:  2024      Surgeon: Surgeon(s):  Enio Vasques MD    Procedure: Procedure(s):  IRRIGATION, DEBRIDEMENT, AND PINNING OF RIGHT THUMB OPEN DISTAL PHALANX FRACTURE    Medications prior to admission:   Prior to Admission medications    Medication Sig Start Date End Date Taking? Authorizing Provider   JARDIANCE 10 MG tablet Take 1 tablet by mouth daily    Tammy Nails MD   carvedilol (COREG) 3.125 MG tablet Take 1 tablet by mouth 2 times daily 23   Tammy Nails MD   meloxicam (MOBIC) 15 MG tablet Take 1 tablet by mouth daily    Tammy Nails MD   sacubitril-valsartan (ENTRESTO) 24-26 MG per tablet Take 1 tablet by mouth 2 times daily    Tammy Nails MD   furosemide (LASIX) 20 MG tablet Take 1 tablet by mouth 2 times daily for 7 days 23  Renard Means MD   acyclovir (ZOVIRAX) 400 MG tablet Take 1 tablet by mouth daily    Automatic Reconciliation, Ar   atorvastatin (LIPITOR) 20 MG tablet Take 1 tablet by mouth daily    Automatic Reconciliation, Ar   omeprazole (PRILOSEC) 40 MG delayed release capsule Take 1 capsule by mouth daily    Automatic Reconciliation, Ar       Current medications:    Current Facility-Administered Medications   Medication Dose Route Frequency Provider Last Rate Last Admin   • ceFAZolin (ANCEF) 2,000 mg in sterile water 20 mL IV syringe  2,000 mg IntraVENous Once Enio Vasques MD       • lidocaine PF 1 % injection 1 mL  1 mL IntraDERmal Once PRN Jamia Carbone MD       • lactated ringers IV soln infusion   IntraVENous Continuous Jamia Carbone MD       • sodium chloride flush 0.9 % injection 5-40 mL  5-40 mL IntraVENous PRN Jamia Carbone MD       • 0.9 % sodium chloride infusion   IntraVENous PRN Jamia Carbone MD       • ceFAZolin (ANCEF) 1 g

## 2024-04-04 NOTE — ANESTHESIA POSTPROCEDURE EVALUATION
Department of Anesthesiology  Postprocedure Note    Patient: Emmanuel Shrestha  MRN: [id number]  YOB: 1955  Date of evaluation: 4/4/2024    Procedure Summary       Date: 04/04/24 Room / Location: [facility]SU  / [facility] AMBULATORY OR    Anesthesia Start: 0831 Anesthesia Stop: 0930    Procedure: IRRIGATION, DEBRIDEMENT, AND PINNING OF RIGHT THUMB OPEN DISTAL PHALANX FRACTURE (Right: Thumb) Diagnosis:       Open displaced fracture of distal phalanx of right thumb, initial encounter      (Open displaced fracture of distal phalanx of right thumb, initial encounter [S62.521B])    Surgeons: Enio Vasques MD Responsible Provider: Jamia Carbone MD    Anesthesia Type: MAC ASA Status: 3            Anesthesia Type: MAC    Quoc Phase I: Quoc Score: 10    Quoc Phase II: Quoc Score: 10    Anesthesia Post Evaluation    Patient location during evaluation: PACU  Patient participation: complete - patient participated  Level of consciousness: awake and alert  Pain score: 0  Airway patency: patent  Nausea & Vomiting: no nausea and no vomiting  Cardiovascular status: hemodynamically stable  Respiratory status: acceptable  Hydration status: euvolemic  Multimodal analgesia pain management approach  Pain management: satisfactory to patient    No notable events documented.

## 2024-04-04 NOTE — PERIOP NOTE
Emmanuel Shrestha  1955  [id number]    Situation:  Verbal report given from: Bradly Null CRNA, Rubina Persaud RN  Procedure: Procedure(s):  IRRIGATION, DEBRIDEMENT, AND PINNING OF RIGHT THUMB OPEN DISTAL PHALANX FRACTURE    Background:    Preoperative diagnosis: Open displaced fracture of distal phalanx of right thumb, initial encounter [S62.521B]    Postoperative diagnosis: * No post-op diagnosis entered *    :  Dr. Vasques    Assistant(s): Circulator: Liliana Vo RN  Relief Circulator: Veronica Lozano RN  Scrub Person First: Rubina Persaud RN    Specimens: * No specimens in log *    Assessment:  Intra-procedure medications         Anesthesia gave intra-procedure sedation and medications, see anesthesia flow sheet     Intravenous fluids: LR@ KVO     Vital signs stable       Recommendation:    Permission to share finding with daughter

## 2024-04-04 NOTE — OP NOTE
[facility]              [address] [place]  [address]                            OPERATIVE REPORT      PATIENT NAME: CEZAR DEL ROSARIO              : 1955  MED REC NO: [id number]                       ROOM: ASU  ACCOUNT NO: [id number]                       ADMIT DATE: 2024  PROVIDER: Enio Vasques MD    DATE OF SERVICE:  2024    PREOPERATIVE DIAGNOSES:  Open displaced fracture, distal phalanx, right thumb.    POSTOPERATIVE DIAGNOSES:  Open displaced fracture, distal phalanx, right thumb.    PROCEDURES PERFORMED:       1. Irrigation and debridement of open fracture, right thumb including debridement of skin, subcutaneous tissue, and bone.     2. Open reduction and internal fixation, right thumb distal phalanx fracture.    SURGEON:  Enio Vasques MD    ASSISTANT:  None.    ANESTHESIA:  Local MAC.    ESTIMATED BLOOD LOSS:  Minimal.    SPECIMENS REMOVED:  None.    INTRAOPERATIVE FINDINGS:  As below.     COMPLICATIONS:  None.    IMPLANTS:  A 0.045 K-wire, that was divided in half to make 2 implants.    INDICATIONS:  The patient sustained a saw injury to his right thumb, causing an open fracture of the distal phalanx, which needed to be pinned.    DESCRIPTION OF PROCEDURE:  The patient was brought to the operating room, placed on operating table in supine position, and sedation administered.  A digital block was then administered to his right thumb after alcohol prep using 9 mL of 2% lidocaine with epinephrine.  The right upper extremity was then prepped and draped in usual manner.  After time out, a Tourni-Cot was placed on the thumb.  Sutures were removed and the fracture site opened up.  There were free fragments of bone and soft tissues scattered about the wound, these and any debris encountered were carefully removed.  The thumb was irrigated several times.  When all devitalized tissue had been removed and the wound was clean, a single 0.045 K-wire

## (undated) DEVICE — HAND-MRMCASU: Brand: MEDLINE INDUSTRIES, INC.

## (undated) DEVICE — Device

## (undated) DEVICE — BLOCK BITE ENDOSCP AD 21 MM W/ DIL BLU LF DISP

## (undated) DEVICE — CONTAINER SPEC 20 ML LID NEUT BUFF FORMALIN 10 % POLYPR STS

## (undated) DEVICE — CORD ES L12FT BPLR FRCP

## (undated) DEVICE — SOLUTION IRRIG 500ML 0.9% SOD CHLO USP POUR PLAS BTL

## (undated) DEVICE — SUTURE ETHLN SZ 4-0 L18IN NONABSORBABLE BLK L19MM PS-2 3/8 1667H

## (undated) DEVICE — BASIN EMSIS 16OZ GRAPHITE PLAS KID SHP MOLD GRAD FOR ORAL

## (undated) DEVICE — 1200 GUARD II KIT W/5MM TUBE W/O VAC TUBE: Brand: GUARDIAN

## (undated) DEVICE — Device: Brand: JELCO

## (undated) DEVICE — SET ADMIN 16ML TBNG L100IN 2 Y INJ SITE IV PIGGY BK DISP

## (undated) DEVICE — KENDALL RADIOLUCENT FOAM MONITORING ELECTRODE RECTANGULAR SHAPE: Brand: KENDALL

## (undated) DEVICE — SET GRAV CK VLV NEEDLESS ST 3 GANGED 4WAY STPCOCK HI FLO 10

## (undated) DEVICE — GLOVE ORANGE PI 8   MSG9080

## (undated) DEVICE — SYR 3ML LL TIP 1/10ML GRAD --

## (undated) DEVICE — SYR 10ML LUER LOK 1/5ML GRAD --

## (undated) DEVICE — CATH IV AUTOGRD BC BLU 22GA 25 -- INSYTE

## (undated) DEVICE — HYPODERMIC SAFETY NEEDLE: Brand: MONOJECT

## (undated) DEVICE — TOWEL 4 PLY TISS 19X30 SUE WHT

## (undated) DEVICE — C-ARM: Brand: UNBRANDED

## (undated) DEVICE — Z DISCONTINUED PER MEDLINE LINE GAS SAMPLING O2/CO2 LNG AD 13 FT NSL W/ TBNG FILTERLINE

## (undated) DEVICE — SYRINGE MED 10ML LUERLOCK TIP W/O SFTY DISP

## (undated) DEVICE — ESOPHAGEAL BALLOON DILATATION CATHETER: Brand: CRE FIXED WIRE

## (undated) DEVICE — NEONATAL-ADULT SPO2 SENSOR: Brand: NELLCOR

## (undated) DEVICE — BITEBLOCK 54FR W/ DENT RIM BLOX

## (undated) DEVICE — BAG SPEC BIOHZRD 10 X 10 IN --

## (undated) DEVICE — SOLIDIFIER MEDC 1200ML -- CONVERT TO 356117

## (undated) DEVICE — CUFF BLD PRSS AD CLTH SGL TB W/ BAYNT CONN ROUNDED CORNER

## (undated) DEVICE — NEEDLE HYPO 18GA L1.5IN PNK S STL HUB POLYPR SHLD REG BVL

## (undated) DEVICE — CATHETER IV 22GA L1IN OD0.8382-0.9144MM ID0.6096-0.6858MM 382523

## (undated) DEVICE — SYSTEM REPROC CBL 3 LD DISPOSABLE

## (undated) DEVICE — IV START KIT: Brand: MEDLINE

## (undated) DEVICE — BANDAGE GZ W2XL75IN ST RAYON POLY CNFRM STRTCH LTWT

## (undated) DEVICE — GLOVE SURG SZ 8 L12IN FNGR THK79MIL GRN LTX FREE

## (undated) DEVICE — FORCEPS BX L160CM DIA8MM GRSP DISECT CUP TIP NONLOCKING ROT

## (undated) DEVICE — FORCEPS BX L240CM JAW DIA2.4MM ORNG L CAP W/ NDL DISP RAD

## (undated) DEVICE — SYRINGE INFL 60ML DISP ALLIANCE II

## (undated) DEVICE — MEDI-VAC YANK SUCT HNDL W/TPRD BULBOUS TIP: Brand: CARDINAL HEALTH